# Patient Record
Sex: FEMALE | Race: WHITE | NOT HISPANIC OR LATINO | Employment: OTHER | URBAN - METROPOLITAN AREA
[De-identification: names, ages, dates, MRNs, and addresses within clinical notes are randomized per-mention and may not be internally consistent; named-entity substitution may affect disease eponyms.]

---

## 2017-01-09 ENCOUNTER — OFFICE VISIT (OUTPATIENT)
Dept: INTERNAL MEDICINE | Facility: CLINIC | Age: 37
End: 2017-01-09

## 2017-01-09 VITALS
SYSTOLIC BLOOD PRESSURE: 119 MMHG | HEART RATE: 73 BPM | WEIGHT: 208.1 LBS | BODY MASS INDEX: 33.6 KG/M2 | DIASTOLIC BLOOD PRESSURE: 83 MMHG

## 2017-01-09 DIAGNOSIS — F32.89 OTHER DEPRESSION: Primary | ICD-10-CM

## 2017-01-09 DIAGNOSIS — E78.00 HIGH BLOOD CHOLESTEROL: ICD-10-CM

## 2017-01-09 LAB
ALBUMIN SERPL-MCNC: 4.2 G/DL (ref 3.4–5)
ALP SERPL-CCNC: 64 U/L (ref 40–150)
ALT SERPL W P-5'-P-CCNC: 26 U/L (ref 0–50)
ANION GAP SERPL CALCULATED.3IONS-SCNC: 6 MMOL/L (ref 3–14)
AST SERPL W P-5'-P-CCNC: 13 U/L (ref 0–45)
BILIRUB SERPL-MCNC: 0.4 MG/DL (ref 0.2–1.3)
BUN SERPL-MCNC: 12 MG/DL (ref 7–30)
CALCIUM SERPL-MCNC: 8.7 MG/DL (ref 8.5–10.1)
CHLORIDE SERPL-SCNC: 105 MMOL/L (ref 94–109)
CHOLEST SERPL-MCNC: 146 MG/DL
CO2 SERPL-SCNC: 26 MMOL/L (ref 20–32)
CREAT SERPL-MCNC: 0.78 MG/DL (ref 0.52–1.04)
GFR SERPL CREATININE-BSD FRML MDRD: 84 ML/MIN/1.7M2
GLUCOSE SERPL-MCNC: 97 MG/DL (ref 70–99)
HDLC SERPL-MCNC: 56 MG/DL
LDLC SERPL CALC-MCNC: 80 MG/DL
NONHDLC SERPL-MCNC: 90 MG/DL
POTASSIUM SERPL-SCNC: 4.3 MMOL/L (ref 3.4–5.3)
PROT SERPL-MCNC: 7.2 G/DL (ref 6.8–8.8)
SODIUM SERPL-SCNC: 137 MMOL/L (ref 133–144)
TRIGL SERPL-MCNC: 51 MG/DL

## 2017-01-09 ASSESSMENT — PAIN SCALES - GENERAL: PAINLEVEL: NO PAIN (0)

## 2017-01-09 NOTE — PROGRESS NOTES
HPI:    Pt. Comes in with concerns regarding outside recent R breast imaging showing a cyst. She is present with her mother. On her mother's side there is a very strong h/o breast cancer at a young age. She o/w denies additional HEENT, cardiopulmonary, abdominal, , GYN, neurological, skin complaints. She has h/o migraines and has been on medication for several years. She was seen Neurology for this 7/27/16 and 9/16 and is doing better.     PMH:    1. Smoking  2. PCOS  3. Laparoscopic dermatoid tumor removal in 2005  4. Increased cholesterol on statin    Fam. Hx. As above for several with early breast cancer    PE:    Vitals noted gen nad cooperative alert, HEENT, ears normal oropharynx clear no exudate, neck supple nl rom no adenopathy, LCTA, B, normal resp. System exam, RRR, S1, S2, no MRG, abdomen, nt, nd, no masses, normal GI system exam. Grossly normal neurological exam. B breast exam w/o masses, no retractions, no discharge.    A/P:    1. She has follow up appointment with Dr. [facility] 12/19/16 and will get repeat breast imaging at age 40 (4 years)  2. She was seen in  GYN for management of PCOS 8/3/16 and has future pelvic U/S.  3. She is working on quitting smoking  4. Increased cholesterol; she saw  Dr. Solomon, Cardiology 10/16 and is on lipitor; labs today are stable  5. She was seen in  Neurology  for migraines 7/16 and 9/16 and is doing better on medication.  6. She remains on Wellbutrin and Zoloft. I recommended if she has concerns or changes we will have her see Psychiatry for a one time visit    Follow up with me in 6 months.             Total time spent 15  minutes.  More than 50% of the time spent with Ms. Oreilly on counseling / coordinating her care

## 2017-01-09 NOTE — MR AVS SNAPSHOT
After Visit Summary   1/9/2017    Majo Oreilly    MRN: [id number]           Patient Information     Date Of Birth          1980        Visit Information        Provider Department      1/9/2017 12:35 PM Cal Walker MD [facility]        Care Instructions    Primary Care Center Medication Refill Request Information:  * Please contact your pharmacy regarding ANY request for medication refills.  ** [facility] Prescription Fax = [phone]  * Please allow 3 business days for routine medication refills.  * Please allow 5 business days for controlled substance medication refills.     Primary Care Center Test Result notification information:  *You will be notified with in 7-10 days of your appointment day regarding the results of your test.  If you are on MyChart you will be notified as soon as the provider has reviewed the results and signed off on them.          Follow-ups after your visit        Your next 10 appointments already scheduled     Jul 10, 2017  1:35 PM   (Arrive by 1:20 PM)   Return Visit with Cal Walker MD   [facility] ([facility] [place])    [address] [place] [address] [phone]              Who to contact     Please call your clinic at [phone] to:    Ask questions about your health    Make or cancel appointments    Discuss your medicines    Learn about your test results    Speak to your doctor   If you have compliments or concerns about an experience at your clinic, or if you wish to file a complaint, please contact [facility] Patient Relations at [phone] or email us at [email or web address]         Additional Information About Your Visit        MyChart Information     Jawfish Gamest gives you secure access to your electronic health record. If you see a primary care provider, you can also send messages to your care team and make appointments. If you  have questions, please call your primary care clinic.  If you do not have a primary care provider, please call [phone] and they will assist you.      Landscape Mobile is an electronic gateway that provides easy, online access to your medical records. With Landscape Mobile, you can request a clinic appointment, read your test results, renew a prescription or communicate with your care team.     To access your existing account, please contact your [facility] or call [phone] for assistance.        Care EveryWhere ID     This is your Care EveryWhere ID. This could be used by other organizations to access your [place] medical records  [id number]        Your Vitals Were     Pulse Breastfeeding?                73 No           Blood Pressure from Last 3 Encounters:   01/09/17 119/83   12/21/16 121/78   10/12/16 118/77    Weight from Last 3 Encounters:   01/09/17 94.394 kg (208 lb 1.6 oz)   12/21/16 96.026 kg (211 lb 11.2 oz)   10/12/16 93.35 kg (205 lb 12.8 oz)              Today, you had the following     No orders found for display       Primary Care Provider Office Phone # Fax #    Cal Walker [phone]       [address] [address]        Thank you!     Thank you for choosing [facility]  for your care. Our goal is always to provide you with excellent care. Hearing back from our patients is one way we can continue to improve our services. Please take a few minutes to complete the written survey that you may receive in the mail after your visit with us. Thank you!             Your Updated Medication List - Protect others around you: Learn how to safely use, store and throw away your medicines at www.disposemymeds.org.          This list is accurate as of: 1/9/17 12:50 PM.  Always use your most recent med list.                   Brand Name Dispense Instructions for use    almotriptan 12.5 MG tablet    AXERT    36  tablet    Take 1 tablet (12.5 mg) by mouth daily as needed for migraine       atorvastatin 20 MG tablet    LIPITOR    30 tablet    Take 1 tablet (20 mg) by mouth daily       buPROPion 100 MG 12 hr tablet    WELLBUTRIN SR    60 tablet    Take 1 tablet (100 mg) by mouth 2 times daily Take 1 tablet daily for the first 3 to 5 days, depending on comfort.       FLUoxetine 10 MG capsule    PROZAC    90 capsule    Take 1 capsule (10 mg) by mouth daily       nicotine 21 MG/24HR 24 hr patch    NICODERM CQ    30 patch    Place 1 patch onto the skin every 24 hours       nicotine polacrilex 2 MG gum    NICORETTE    100 tablet    Chew 1 piece every 1-2 hours as directed.       norgestimate-ethinyl estradiol 0.25-35 MG-MCG per tablet    ORTHO-CYCLEN, SPRINTEC     Take 1 tablet by mouth daily       propranolol 80 MG 24 hr capsule    INDERAL LA    30 capsule    Take 1 capsule (80 mg) by mouth daily

## 2017-01-09 NOTE — PATIENT INSTRUCTIONS
Primary Care Center Medication Refill Request Information:  * Please contact your pharmacy regarding ANY request for medication refills.  ** [facility] Prescription Fax = [phone]  * Please allow 3 business days for routine medication refills.  * Please allow 5 business days for controlled substance medication refills.     Primary Care Center Test Result notification information:  *You will be notified with in 7-10 days of your appointment day regarding the results of your test.  If you are on MyChart you will be notified as soon as the provider has reviewed the results and signed off on them.

## 2017-01-09 NOTE — NURSING NOTE
Chief Complaint   Patient presents with     Results     Patient here for results from other appointments.     Mariam Flores LPN at 12:19 PM on 1/9/2017.

## 2017-07-01 DIAGNOSIS — F32.89 OTHER DEPRESSION: ICD-10-CM

## 2017-07-01 DIAGNOSIS — G43.009 NONINTRACTABLE MIGRAINE, UNSPECIFIED MIGRAINE TYPE: ICD-10-CM

## 2017-07-01 RX ORDER — FLUOXETINE 10 MG/1
10 CAPSULE ORAL DAILY
Qty: 90 CAPSULE | Refills: 1 | Status: SHIPPED | OUTPATIENT
Start: 2017-07-01 | End: 2017-12-28

## 2017-07-01 NOTE — TELEPHONE ENCOUNTER
FLUoxetine (PROZAC) 10 MG capsule  Last Written Prescription Date:  6/21/16  Last Fill Quantity: 90,   # refills: 3  Last Office Visit with [facility], [facility] or [facility] prescribing provider: 1/9/17  Future Office visit:   7/24/17

## 2017-07-14 DIAGNOSIS — E78.5 HYPERLIPIDEMIA LDL GOAL <100: Primary | ICD-10-CM

## 2017-07-14 RX ORDER — SIMVASTATIN 20 MG
20 TABLET ORAL AT BEDTIME
Qty: 90 TABLET | Refills: 3 | Status: SHIPPED | OUTPATIENT
Start: 2017-07-14 | End: 2018-06-25

## 2017-07-24 ENCOUNTER — OFFICE VISIT (OUTPATIENT)
Dept: INTERNAL MEDICINE | Facility: CLINIC | Age: 37
End: 2017-07-24

## 2017-07-24 VITALS
DIASTOLIC BLOOD PRESSURE: 85 MMHG | WEIGHT: 212.3 LBS | SYSTOLIC BLOOD PRESSURE: 124 MMHG | HEART RATE: 69 BPM | BODY MASS INDEX: 34.27 KG/M2 | RESPIRATION RATE: 16 BRPM

## 2017-07-24 DIAGNOSIS — E78.00 HIGH BLOOD CHOLESTEROL: Primary | ICD-10-CM

## 2017-07-24 ASSESSMENT — PAIN SCALES - GENERAL: PAINLEVEL: NO PAIN (0)

## 2017-07-24 NOTE — NURSING NOTE
Chief Complaint   Patient presents with     Recheck Medication     pt is here for a medication follow up        Monica Josue CMA at 2:54 PM on 7/24/2017

## 2017-07-24 NOTE — PATIENT INSTRUCTIONS
Primary Care Center Phone Number [phone]  Primary Care Center Medication Refill Request Information:  * Please contact your pharmacy regarding ANY request for medication refills.  ** [facility] Prescription Fax = [phone]  * Please allow 3 business days for routine medication refills.  * Please allow 5 business days for controlled substance medication refills.     Primary Care Center Test Result notification information:  *You will be notified with in 7-10 days of your appointment day regarding the results of your test.  If you are on MyChart you will be notified as soon as the provider has reviewed the results and signed off on them.

## 2017-07-24 NOTE — MR AVS SNAPSHOT
After Visit Summary   7/24/2017    Majo Oreilly    MRN: [id number]           Patient Information     Date Of Birth          1980        Visit Information        Provider Department      7/24/2017 3:05 PM Cal Walker MD [facility]        Today's Diagnoses     High blood cholesterol    -  1      Care Instructions    Primary Care Center Phone Number [phone]  Primary Care Center Medication Refill Request Information:  * Please contact your pharmacy regarding ANY request for medication refills.  ** [facility] Prescription Fax = [phone]  * Please allow 3 business days for routine medication refills.  * Please allow 5 business days for controlled substance medication refills.     Primary Care Center Test Result notification information:  *You will be notified with in 7-10 days of your appointment day regarding the results of your test.  If you are on MyChart you will be notified as soon as the provider has reviewed the results and signed off on them.                  Follow-ups after your visit        Your next 10 appointments already scheduled     Aug 24, 2017  9:30 AM CDT   LAB with [facility] ([facility])    [address] [place] [address] [phone]           Patient must bring picture ID.  Patient should be prepared to give a urine specimen  Please do not eat 10-12 hours before your appointment if you are coming in fasting for labs on lipids, cholesterol, or glucose (sugar).  Pregnant women should follow their Care Team instructions. Water with medications is okay. Do not drink coffee or other fluids.   If you have concerns about taking  your medications, please ask at office or if scheduling via Curio, send a message by clicking on Secure Messaging, Message Your Care Team.            Oct 24, 2017  3:35 PM CDT   (Arrive by 3:20 PM)   Return Visit with Cal Walker MD   [facility]  ([facility] [place])    [address]  4th Floor  [facility] [address] [phone]              Future tests that were ordered for you today     Open Future Orders        Priority Expected Expires Ordered    CBC with platelets differential Routine 7/24/2017 8/7/2017 7/24/2017    Lipid panel reflex to direct LDL Routine  7/24/2018 7/24/2017    Comprehensive metabolic panel Routine 7/24/2017 7/24/2018 7/24/2017            Who to contact     Please call your clinic at [phone] to:    Ask questions about your health    Make or cancel appointments    Discuss your medicines    Learn about your test results    Speak to your doctor   If you have compliments or concerns about an experience at your clinic, or if you wish to file a complaint, please contact [facility] Patient Relations at [phone] or email us at [email or web address]         Additional Information About Your Visit        KueskiharInvenQuery Information     Clean PETt gives you secure access to your electronic health record. If you see a primary care provider, you can also send messages to your care team and make appointments. If you have questions, please call your primary care clinic.  If you do not have a primary care provider, please call [phone] and they will assist you.      Xtellus is an electronic gateway that provides easy, online access to your medical records. With Xtellus, you can request a clinic appointment, read your test results, renew a prescription or communicate with your care team.     To access your existing account, please contact your [facility] or call [phone] for assistance.        Care EveryWhere ID     This is your Care EveryWhere ID. This could be used by other organizations to access your [place] medical records  [id number]        Your Vitals Were     Pulse Respirations Breastfeeding? BMI (Body Mass Index)          69 16 No  34.27 kg/m2         Blood Pressure from Last 3 Encounters:   07/24/17 124/85   01/09/17 119/83   12/21/16 121/78    Weight from Last 3 Encounters:   07/24/17 96.3 kg (212 lb 4.8 oz)   01/09/17 94.4 kg (208 lb 1.6 oz)   12/21/16 96 kg (211 lb 11.2 oz)               Primary Care Provider Office Phone # Fax #    Cal ANAYA MD Denise [phone]       [address] [address]        Equal Access to Services     [facility]: Hadii aad ku hadasho Soomaali, waaxda luqadaha, qaybta kaalmada adeegyada, jimmy montes . So [facility] [phone].    ATENCIÓN: Si habla español, tiene a whitmore disposición servicios gratuitos de asistencia lingüística. Llame al [phone].    We comply with applicable federal civil rights laws and [place] laws. We do not discriminate on the basis of race, color, national origin, age, disability sex, sexual orientation or gender identity.            Thank you!     Thank you for choosing [facility]  for your care. Our goal is always to provide you with excellent care. Hearing back from our patients is one way we can continue to improve our services. Please take a few minutes to complete the written survey that you may receive in the mail after your visit with us. Thank you!             Your Updated Medication List - Protect others around you: Learn how to safely use, store and throw away your medicines at www.disposemymeds.org.          This list is accurate as of: 7/24/17  3:25 PM.  Always use your most recent med list.                   Brand Name Dispense Instructions for use Diagnosis    almotriptan 12.5 MG tablet    AXERT    36 tablet    Take 1 tablet (12.5 mg) by mouth daily as needed for migraine    Migraine without aura and without status migrainosus, not intractable       FLUoxetine 10 MG capsule    PROZAC    90 capsule    Take 1 capsule (10 mg) by mouth daily    Other depression, Nonintractable  migraine, unspecified migraine type       norgestimate-ethinyl estradiol 0.25-35 MG-MCG per tablet    ORTHO-CYCLEN, SPRINTEC     Take 1 tablet by mouth daily    Nonintractable migraine, unspecified migraine type, Other depression       propranolol 80 MG 24 hr capsule    INDERAL LA    30 capsule    Take 1 capsule (80 mg) by mouth daily    Chronic migraine without aura without status migrainosus, not intractable       simvastatin 20 MG tablet    ZOCOR    90 tablet    Take 1 tablet (20 mg) by mouth At Bedtime    Hyperlipidemia LDL goal <100

## 2017-07-24 NOTE — PROGRESS NOTES
HPI:    Pt. Comes in for follow up She had issues  with   outside  R breast imaging showing a cyst.  On her mother's side there is a very strong h/o breast cancer at a young age. She o/w denies additional HEENT, cardiopulmonary, abdominal, , GYN, neurological, skin complaints. She has h/o migraines and has been on medication for several years. She was seen Neurology for this 7/27/16 and 9/16 and is doing better.     PMH:    1. Smoking  2. PCOS  3. Laparoscopic dermatoid tumor removal in 2005  4. Increased cholesterol on statin    Fam. Hx. As above for several with early breast cancer    PE:    Vitals noted gen nad cooperative alert, HEENT, ears normal oropharynx clear no exudate, neck supple nl rom no adenopathy, LCTA, B, normal resp. System exam, RRR, S1, S2, no MRG, abdomen, nt, nd, no masses, normal GI system exam. Grossly normal neurological exam.     A/P:    1. She has follow up appointment with Dr. [facility] 12/19/16 and will get repeat breast imaging at age 40 (4 years)  2. She was seen in  GYN for management of PCOS 8/3/16 and has future pelvic U/S.  3. She has stopped  Smoking for about 3 weeks today  4. Increased cholesterol; she saw  Dr. Solomon, Cardiology 10/16. She had to stop Lipitor because of side effects and now is on Simvastatin (for about 3 weeks). No side effects and placed future fasting lipid order labs  5. She was seen in  Neurology  for migraines 7/16 and 9/16 and is doing better on medication.  6. She remains on Wellbutrin and Zoloft. I recommended if she has concerns or changes we will have her see Psychiatry for a one time visit    Follow up with me in 3 months.             Total time spent 15  minutes.  More than 50% of the time spent with Ms. Oreilly on counseling / coordinating her care

## 2017-08-24 DIAGNOSIS — E78.00 HIGH BLOOD CHOLESTEROL: ICD-10-CM

## 2017-08-24 LAB
ALBUMIN SERPL-MCNC: 3.7 G/DL (ref 3.4–5)
ALP SERPL-CCNC: 64 U/L (ref 40–150)
ALT SERPL W P-5'-P-CCNC: 18 U/L (ref 0–50)
ANION GAP SERPL CALCULATED.3IONS-SCNC: 7 MMOL/L (ref 3–14)
AST SERPL W P-5'-P-CCNC: 10 U/L (ref 0–45)
BASOPHILS # BLD AUTO: 0 10E9/L (ref 0–0.2)
BASOPHILS NFR BLD AUTO: 0.4 %
BILIRUB SERPL-MCNC: 0.5 MG/DL (ref 0.2–1.3)
BUN SERPL-MCNC: 7 MG/DL (ref 7–30)
CALCIUM SERPL-MCNC: 8.2 MG/DL (ref 8.5–10.1)
CHLORIDE SERPL-SCNC: 106 MMOL/L (ref 94–109)
CHOLEST SERPL-MCNC: 136 MG/DL
CO2 SERPL-SCNC: 24 MMOL/L (ref 20–32)
CREAT SERPL-MCNC: 0.77 MG/DL (ref 0.52–1.04)
DIFFERENTIAL METHOD BLD: ABNORMAL
EOSINOPHIL # BLD AUTO: 0.3 10E9/L (ref 0–0.7)
EOSINOPHIL NFR BLD AUTO: 2.9 %
ERYTHROCYTE [DISTWIDTH] IN BLOOD BY AUTOMATED COUNT: 13.3 % (ref 10–15)
GFR SERPL CREATININE-BSD FRML MDRD: 84 ML/MIN/1.7M2
GLUCOSE SERPL-MCNC: 90 MG/DL (ref 70–99)
HCT VFR BLD AUTO: 42.1 % (ref 35–47)
HDLC SERPL-MCNC: 44 MG/DL
HGB BLD-MCNC: 14.3 G/DL (ref 11.7–15.7)
IMM GRANULOCYTES # BLD: 0 10E9/L (ref 0–0.4)
IMM GRANULOCYTES NFR BLD: 0.4 %
LDLC SERPL CALC-MCNC: 76 MG/DL
LYMPHOCYTES # BLD AUTO: 3.3 10E9/L (ref 0.8–5.3)
LYMPHOCYTES NFR BLD AUTO: 29.7 %
MCH RBC QN AUTO: 29.5 PG (ref 26.5–33)
MCHC RBC AUTO-ENTMCNC: 34 G/DL (ref 31.5–36.5)
MCV RBC AUTO: 87 FL (ref 78–100)
MONOCYTES # BLD AUTO: 0.5 10E9/L (ref 0–1.3)
MONOCYTES NFR BLD AUTO: 4.3 %
NEUTROPHILS # BLD AUTO: 7 10E9/L (ref 1.6–8.3)
NEUTROPHILS NFR BLD AUTO: 62.3 %
NONHDLC SERPL-MCNC: 92 MG/DL
NRBC # BLD AUTO: 0 10*3/UL
NRBC BLD AUTO-RTO: 0 /100
PLATELET # BLD AUTO: 343 10E9/L (ref 150–450)
POTASSIUM SERPL-SCNC: 4 MMOL/L (ref 3.4–5.3)
PROT SERPL-MCNC: 7 G/DL (ref 6.8–8.8)
RBC # BLD AUTO: 4.85 10E12/L (ref 3.8–5.2)
SODIUM SERPL-SCNC: 137 MMOL/L (ref 133–144)
TRIGL SERPL-MCNC: 84 MG/DL
WBC # BLD AUTO: 11.1 10E9/L (ref 4–11)

## 2017-10-22 ENCOUNTER — HEALTH MAINTENANCE LETTER (OUTPATIENT)
Age: 37
End: 2017-10-22

## 2017-10-24 ENCOUNTER — OFFICE VISIT (OUTPATIENT)
Dept: INTERNAL MEDICINE | Facility: CLINIC | Age: 37
End: 2017-10-24

## 2017-10-24 VITALS
WEIGHT: 217.3 LBS | DIASTOLIC BLOOD PRESSURE: 84 MMHG | HEART RATE: 64 BPM | BODY MASS INDEX: 35.07 KG/M2 | SYSTOLIC BLOOD PRESSURE: 119 MMHG

## 2017-10-24 DIAGNOSIS — G43.709 CHRONIC MIGRAINE WITHOUT AURA WITHOUT STATUS MIGRAINOSUS, NOT INTRACTABLE: ICD-10-CM

## 2017-10-24 DIAGNOSIS — Z23 NEED FOR PROPHYLACTIC VACCINATION AND INOCULATION AGAINST INFLUENZA: Primary | ICD-10-CM

## 2017-10-24 RX ORDER — PROPRANOLOL HYDROCHLORIDE 80 MG/1
80 CAPSULE, EXTENDED RELEASE ORAL DAILY
Qty: 30 CAPSULE | Refills: 11 | Status: SHIPPED | OUTPATIENT
Start: 2017-10-24 | End: 2018-10-29

## 2017-10-24 ASSESSMENT — PAIN SCALES - GENERAL: PAINLEVEL: NO PAIN (0)

## 2017-10-24 NOTE — NURSING NOTE
Chief Complaint   Patient presents with     Results     Patient here to go over results.     Mariam Flores LPN at 9:36 AM on 10/24/2017.

## 2017-10-24 NOTE — PROGRESS NOTES
HPI:    Pt. Comes in for follow up She had issues  with   outside  R breast imaging showing a cyst.  On her mother's side there is a very strong h/o breast cancer at a young age. She o/w denies additional HEENT, cardiopulmonary, abdominal, , GYN, neurological, skin complaints. She has h/o migraines and has been on medication for several years. She was seen Neurology for this 7/27/16 and 9/16 and is doing better.     PMH:    1. Smoking  2. PCOS  3. Laparoscopic dermatoid tumor removal in 2005  4. Increased cholesterol on statin    Fam. Hx. As above for several with early breast cancer    PE:    Vitals noted gen nad cooperative alert, HEENT, ears normal oropharynx clear no exudate, neck supple nl rom no adenopathy, LCTA, B, normal resp. System exam, RRR, S1, S2, no MRG, abdomen, nt, nd, no masses, normal GI system exam. Grossly normal neurological exam.     A/P:    1. She has follow up appointment with Dr. [facility] 12/19/16 and will get repeat breast imaging at age 40 (4 years)  2. She was seen in  GYN for management of PCOS 8/3/16 and has future pelvic U/S.  3. She is down to 2 cigarettes a day now  4. Increased cholesterol; she saw  Dr. Solomon, Cardiology 10/16. She had to stop Lipitor because of side effects and now is on Simvastatin (for about 3 weeks). No side effects. Labs checked 8/17  5. She was seen in  Neurology  for migraines 7/16 and 9/16 and is doing better on medication. She states rare migraines now. Usually once a month (when before about 5 times a month)  6. She remains on Fluoxetine low dose w/o problems. She does not want to change or increase      Follow up with me 8/2018            Total time spent 15  minutes.  More than 50% of the time spent with Ms. Oreilly on counseling / coordinating her care

## 2017-10-24 NOTE — MR AVS SNAPSHOT
After Visit Summary   10/24/2017    Majo Oreilly    MRN: [id number]           Patient Information     Date Of Birth          1980        Visit Information        Provider Department      10/24/2017 9:35 AM Cal Walker MD [facility]        Today's Diagnoses     Need for prophylactic vaccination and inoculation against influenza    -  1    Chronic migraine without aura without status migrainosus, not intractable           Follow-ups after your visit        Your next 10 appointments already scheduled     Jul 23, 2018 10:05 AM CDT   (Arrive by 9:50 AM)   Return Visit with Cal Walker MD   [facility] ([facility])    [address]  4th Floor  [facility] [address] [phone]              Who to contact     Please call your clinic at [phone] to:    Ask questions about your health    Make or cancel appointments    Discuss your medicines    Learn about your test results    Speak to your doctor   If you have compliments or concerns about an experience at your clinic, or if you wish to file a complaint, please contact [facility] Patient Relations at [phone] or email us at [email or web address]         Additional Information About Your Visit        MyChart Information     [employer] gives you secure access to your electronic health record. If you see a primary care provider, you can also send messages to your care team and make appointments. If you have questions, please call your primary care clinic.  If you do not have a primary care provider, please call [phone] and they will assist you.      [employer] is an electronic gateway that provides easy, online access to your medical records. With [employer], you can request a clinic appointment, read your test results, renew a prescription or communicate with your care team.     To access your existing account, please contact your  [facility] or call [phone] for assistance.        Care EveryWhere ID     This is your Care EveryWhere ID. This could be used by other organizations to access your [place] medical records  [id number]        Your Vitals Were     Pulse Breastfeeding? BMI (Body Mass Index)             64 No 35.07 kg/m2          Blood Pressure from Last 3 Encounters:   10/24/17 119/84   07/24/17 124/85   01/09/17 119/83    Weight from Last 3 Encounters:   10/24/17 98.6 kg (217 lb 4.8 oz)   07/24/17 96.3 kg (212 lb 4.8 oz)   01/09/17 94.4 kg (208 lb 1.6 oz)              We Performed the Following     FLU VACCINE, 3 YRS +, IM  [[id number]]          Where to get your medicines      These medications were sent to [facility] [address] [place] [address] [address] AT [address] [place] [address] & [address] [address]    Hours:  24-hours Phone:  [phone]     propranolol 80 MG 24 hr capsule          Primary Care Provider Office Phone # Fax #    Cal Walker [phone]       [address] [address]        Equal Access to Services     MOOKIE WOOD : Hadii jennifer caro hadasho Soomaali, waaxda luqadaha, qaybta kaalmada adeegyada, waxmerly moncada. So [facility] [phone].    ATENCIÓN: Si habla español, tiene a whitmore disposición servicios gratuitos de asistencia lingüística. Llame al [phone].    We comply with applicable federal civil rights laws and [place] laws. We do not discriminate on the basis of race, color, national origin, age, disability, sex, sexual orientation, or gender identity.            Thank you!     Thank you for choosing [facility]  for your care. Our goal is always to provide you with excellent care. Hearing back from our patients is one way we can continue to improve our services. Please take a few minutes to complete the written survey that you may receive in the mail after your visit  with us. Thank you!             Your Updated Medication List - Protect others around you: Learn how to safely use, store and throw away your medicines at www.disposemymeds.org.          This list is accurate as of: 10/24/17  9:55 AM.  Always use your most recent med list.                   Brand Name Dispense Instructions for use Diagnosis    almotriptan 12.5 MG tablet    AXERT    36 tablet    Take 1 tablet (12.5 mg) by mouth daily as needed for migraine    Migraine without aura and without status migrainosus, not intractable       FLUoxetine 10 MG capsule    PROZAC    90 capsule    Take 1 capsule (10 mg) by mouth daily    Other depression, Nonintractable migraine, unspecified migraine type       norgestimate-ethinyl estradiol 0.25-35 MG-MCG per tablet    ORTHO-CYCLEN, SPRINTEC     Take 1 tablet by mouth daily    Nonintractable migraine, unspecified migraine type, Other depression       propranolol 80 MG 24 hr capsule    INDERAL LA    30 capsule    Take 1 capsule (80 mg) by mouth daily    Chronic migraine without aura without status migrainosus, not intractable       simvastatin 20 MG tablet    ZOCOR    90 tablet    Take 1 tablet (20 mg) by mouth At Bedtime    Hyperlipidemia LDL goal <100

## 2017-10-24 NOTE — NURSING NOTE
"Injectable Influenza Immunization Documentation    1.  Has the patient received the information for the injectable influenza vaccine? YES     2. Is the patient 6 months of age or older? YES     3. Does the patient have any of the following contraindications?         Severe allergy to eggs? No     Severe allergic reaction to previous influenza vaccines? No   Severe allergy to latex? No       History of Guillain-[place] syndrome? No     Currently have a temperature greater than 100.4F? No        4.  Severely egg allergic patients should have flu vaccine eligibility assessed by an MD, RN, or pharmacist, and those who received flu vaccine should be observed for 15 min by an MD, RN, Pharmacist, Medical Technician, or member of clinic staff.\": YES    5. Latex-allergic patients should be given latex-free influenza vaccine N/A. Please reference the Vaccine latex table to determine if your clinic s product is latex-containing.       Vaccination given by Mariam Flores LPN at 10:10 AM on 10/24/2017.          "

## 2017-12-28 DIAGNOSIS — G43.009 NONINTRACTABLE MIGRAINE, UNSPECIFIED MIGRAINE TYPE: ICD-10-CM

## 2017-12-28 DIAGNOSIS — F32.89 OTHER DEPRESSION: ICD-10-CM

## 2017-12-29 NOTE — TELEPHONE ENCOUNTER
FLUoxetine (PROZAC) 10 MG capsule      Last Written Prescription Date:  7-1-17  Last Fill Quantity: 90,   # refills: 1  Last Office Visit : 10-24-17  Future Office visit:  7-23-18    Routing refill request to provider for review/approval because:  No PHQ-9 on record      Kathleen M Doege RN

## 2018-01-02 RX ORDER — FLUOXETINE 10 MG/1
10 CAPSULE ORAL DAILY
Qty: 90 CAPSULE | Refills: 0 | Status: SHIPPED | OUTPATIENT
Start: 2018-01-02 | End: 2018-03-22

## 2018-03-22 DIAGNOSIS — F32.89 OTHER DEPRESSION: ICD-10-CM

## 2018-03-22 DIAGNOSIS — G43.009 NONINTRACTABLE MIGRAINE, UNSPECIFIED MIGRAINE TYPE: ICD-10-CM

## 2018-03-22 RX ORDER — FLUOXETINE 10 MG/1
CAPSULE ORAL
Qty: 90 CAPSULE | Refills: 1 | Status: SHIPPED | OUTPATIENT
Start: 2018-03-22 | End: 2018-08-01

## 2018-03-22 NOTE — TELEPHONE ENCOUNTER
prozac  Last Written Prescription Date:  1/2/18  Last Fill Quantity: 90  # refills: 0  Last Office Visit : 10/24/17  Future Office visit:  7/23/18    Routing refill request to clinic nurse for review/approval because:  Failed protocol

## 2018-06-25 DIAGNOSIS — E78.5 HYPERLIPIDEMIA LDL GOAL <100: ICD-10-CM

## 2018-06-28 RX ORDER — SIMVASTATIN 20 MG
20 TABLET ORAL AT BEDTIME
Qty: 90 TABLET | Refills: 0 | Status: SHIPPED | OUTPATIENT
Start: 2018-06-28 | End: 2018-07-23

## 2018-07-19 ENCOUNTER — MEDICAL CORRESPONDENCE (OUTPATIENT)
Dept: HEALTH INFORMATION MANAGEMENT | Facility: CLINIC | Age: 38
End: 2018-07-19

## 2018-07-19 DIAGNOSIS — D27.0 TERATOMA OF OVARY, RIGHT: Primary | ICD-10-CM

## 2018-07-23 ENCOUNTER — OFFICE VISIT (OUTPATIENT)
Dept: INTERNAL MEDICINE | Facility: CLINIC | Age: 38
End: 2018-07-23
Payer: COMMERCIAL

## 2018-07-23 VITALS
DIASTOLIC BLOOD PRESSURE: 82 MMHG | SYSTOLIC BLOOD PRESSURE: 115 MMHG | RESPIRATION RATE: 16 BRPM | WEIGHT: 211 LBS | BODY MASS INDEX: 34.06 KG/M2 | HEART RATE: 74 BPM

## 2018-07-23 DIAGNOSIS — D27.0 TERATOMA OF OVARY, RIGHT: ICD-10-CM

## 2018-07-23 DIAGNOSIS — E78.00 HIGH BLOOD CHOLESTEROL: Primary | ICD-10-CM

## 2018-07-23 DIAGNOSIS — E78.5 HYPERLIPIDEMIA LDL GOAL <100: ICD-10-CM

## 2018-07-23 LAB
ALBUMIN SERPL-MCNC: 3.6 G/DL (ref 3.4–5)
ALP SERPL-CCNC: 78 U/L (ref 40–150)
ALT SERPL W P-5'-P-CCNC: 21 U/L (ref 0–50)
ANION GAP SERPL CALCULATED.3IONS-SCNC: 8 MMOL/L (ref 3–14)
AST SERPL W P-5'-P-CCNC: 14 U/L (ref 0–45)
BASOPHILS # BLD AUTO: 0 10E9/L (ref 0–0.2)
BASOPHILS NFR BLD AUTO: 0.2 %
BILIRUB SERPL-MCNC: 0.4 MG/DL (ref 0.2–1.3)
BUN SERPL-MCNC: 6 MG/DL (ref 7–30)
CALCIUM SERPL-MCNC: 8.3 MG/DL (ref 8.5–10.1)
CHLORIDE SERPL-SCNC: 103 MMOL/L (ref 94–109)
CHOLEST SERPL-MCNC: 132 MG/DL
CO2 SERPL-SCNC: 22 MMOL/L (ref 20–32)
CREAT SERPL-MCNC: 0.76 MG/DL (ref 0.52–1.04)
DIFFERENTIAL METHOD BLD: ABNORMAL
EOSINOPHIL # BLD AUTO: 0.3 10E9/L (ref 0–0.7)
EOSINOPHIL NFR BLD AUTO: 1.8 %
ERYTHROCYTE [DISTWIDTH] IN BLOOD BY AUTOMATED COUNT: 12.9 % (ref 10–15)
GFR SERPL CREATININE-BSD FRML MDRD: 86 ML/MIN/1.7M2
GLUCOSE SERPL-MCNC: 98 MG/DL (ref 70–99)
HCT VFR BLD AUTO: 42.6 % (ref 35–47)
HDLC SERPL-MCNC: 39 MG/DL
HGB BLD-MCNC: 14.7 G/DL (ref 11.7–15.7)
IMM GRANULOCYTES # BLD: 0.1 10E9/L (ref 0–0.4)
IMM GRANULOCYTES NFR BLD: 0.7 %
LDLC SERPL CALC-MCNC: 70 MG/DL
LYMPHOCYTES # BLD AUTO: 3.3 10E9/L (ref 0.8–5.3)
LYMPHOCYTES NFR BLD AUTO: 23.2 %
MCH RBC QN AUTO: 29.8 PG (ref 26.5–33)
MCHC RBC AUTO-ENTMCNC: 34.5 G/DL (ref 31.5–36.5)
MCV RBC AUTO: 86 FL (ref 78–100)
MONOCYTES # BLD AUTO: 0.7 10E9/L (ref 0–1.3)
MONOCYTES NFR BLD AUTO: 4.8 %
NEUTROPHILS # BLD AUTO: 9.8 10E9/L (ref 1.6–8.3)
NEUTROPHILS NFR BLD AUTO: 69.3 %
NONHDLC SERPL-MCNC: 93 MG/DL
NRBC # BLD AUTO: 0 10*3/UL
NRBC BLD AUTO-RTO: 0 /100
PLATELET # BLD AUTO: 330 10E9/L (ref 150–450)
POTASSIUM SERPL-SCNC: 3.9 MMOL/L (ref 3.4–5.3)
PROT SERPL-MCNC: 7.2 G/DL (ref 6.8–8.8)
RBC # BLD AUTO: 4.94 10E12/L (ref 3.8–5.2)
SODIUM SERPL-SCNC: 134 MMOL/L (ref 133–144)
TRIGL SERPL-MCNC: 112 MG/DL
WBC # BLD AUTO: 14.2 10E9/L (ref 4–11)

## 2018-07-23 RX ORDER — SIMVASTATIN 20 MG
20 TABLET ORAL AT BEDTIME
Qty: 90 TABLET | Refills: 3 | Status: SHIPPED | OUTPATIENT
Start: 2018-07-23 | End: 2019-09-13

## 2018-07-23 ASSESSMENT — PAIN SCALES - GENERAL: PAINLEVEL: MODERATE PAIN (5)

## 2018-07-23 NOTE — PROGRESS NOTES
HPI:    Pt. Comes in for follow up She had issues  with   outside  R breast imaging showing a cyst.  On her mother's side there is a very strong h/o breast cancer at a young age. She o/w denies additional HEENT, cardiopulmonary, abdominal, , GYN, neurological, skin complaints. She has h/o migraines and has been on medication for several years. She was seen Neurology for this 7/27/16 and 9/16 and is doing better.     PMH:    1. Smoking  2. PCOS  3. Laparoscopic dermatoid tumor removal in 2005  4. Increased cholesterol on statin    Fam. Hx. As above for several with early breast cancer    PE:    Vitals noted gen nad cooperative alert, HEENT, ears normal oropharynx clear no exudate, neck supple nl rom no adenopathy, LCTA, B, normal resp. System exam, RRR, S1, S2, no MRG, abdomen, nt, nd, no masses, normal GI system exam. Grossly normal neurological exam.     A/P:    1. She has follow up appointment with Dr. [facility] 12/19/16 and will get repeat breast imaging at age 40 (4 years)  2. She was seen in  GYN for management of PCOS 8/3/16 and has future pelvic U/S.  3. She is down to 2 cigarettes a day now  4. Increased cholesterol; she saw  Dr. Solomon, Cardiology 10/16. She had to stop Lipitor because of side effects and now is on Simvastatin (for about 3 weeks). No side effects. Labs checked 8/17. Ordered labs today and refilled Simvastatin. She is aware she can't be pregnant on this medication.   5. She was seen in  Neurology  for migraines 7/16 and 9/16 and is doing better on medication. She states rare migraines now. Usually once a month (when before about 5 times a month)  6. She remains on Fluoxetine low dose w/o problems. She does not want to change or increase  7. Seen at [facility] (see note in Care Everywhere 7/11/2018; she had imaging, MRI/pelvic U/S and lab tests). Surgery on 8/6/2018 is planned at [facility] for ovarian cyst and possible partial hysterectomy.                   Total time spent 15   minutes.  More than 50% of the time spent with Ms. Oreilly on counseling / coordinating her care

## 2018-07-23 NOTE — MR AVS SNAPSHOT
After Visit Summary   7/23/2018    Majo Oreilly    MRN: [id number]           Patient Information     Date Of Birth          1980        Visit Information        Provider Department      7/23/2018 10:05 AM Cal Walker MD [facility]        Today's Diagnoses     High blood cholesterol    -  1    Hyperlipidemia LDL goal <100        Teratoma of ovary, right           Follow-ups after your visit        Your next 10 appointments already scheduled     Jul 23, 2018 10:45 AM CDT   LAB with  LAB   [facility] ([facility])    [address] [place] [address] [phone]           Please do not eat 10-12 hours before your appointment if you are coming in fasting for labs on lipids, cholesterol, or glucose (sugar). This does not apply to pregnant women. Water, hot tea and black coffee (with nothing added) are okay. Do not drink other fluids, diet soda or chew gum.              Who to contact     Please call your clinic at [phone] to:    Ask questions about your health    Make or cancel appointments    Discuss your medicines    Learn about your test results    Speak to your doctor            Additional Information About Your Visit        [employer] Information     [employer] gives you secure access to your electronic health record. If you see a primary care provider, you can also send messages to your care team and make appointments. If you have questions, please call your primary care clinic.  If you do not have a primary care provider, please call [phone] and they will assist you.      [employer] is an electronic gateway that provides easy, online access to your medical records. With [employer], you can request a clinic appointment, read your test results, renew a prescription or communicate with your care team.     To access your existing account, please contact your [facility] or call [phone]  for assistance.        Care EveryWhere ID     This is your Care EveryWhere ID. This could be used by other organizations to access your [place] medical records  [id number]        Your Vitals Were     Pulse Respirations Last Period Breastfeeding? BMI (Body Mass Index)       74 16 07/01/2018 No 34.06 kg/m2        Blood Pressure from Last 3 Encounters:   07/23/18 115/82   10/24/17 119/84   07/24/17 124/85    Weight from Last 3 Encounters:   07/23/18 95.7 kg (211 lb)   10/24/17 98.6 kg (217 lb 4.8 oz)   07/24/17 96.3 kg (212 lb 4.8 oz)                 Where to get your medicines      These medications were sent to [email or web address] [address] [place] [address] [address] AT [address] [place] [address] & [facility]  [address] [place] [address] AVE [facility] [address]    Hours:  24-hours Phone:  [phone]     simvastatin 20 MG tablet          Primary Care Provider Office Phone # Fax #    Cal Walker [phone]       [address] [address]        Equal Access to Services     [facility] HospitalHARDEEP AH: Hadii aad ku hadasho Soomaali, waaxda luqadaha, qaybta kaalmada adeegyada, waxay idiin hayaan adejazmyn thomas lajohn ah. So [facility] [phone].    ATENCIÓN: Si habla español, tiene a whitmore disposición servicios gratuitos de asistencia lingüística. Yumiko al [phone].    We comply with applicable federal civil rights laws and [place] laws. We do not discriminate on the basis of race, color, national origin, age, disability, sex, sexual orientation, or gender identity.            Thank you!     Thank you for choosing [facility]  for your care. Our goal is always to provide you with excellent care. Hearing back from our patients is one way we can continue to improve our services. Please take a few minutes to complete the written survey that you may receive in the mail after your visit with us. Thank you!             Your Updated Medication List - Protect others around you: Learn how to safely use, store  and throw away your medicines at www.disposemymeds.org.          This list is accurate as of 7/23/18 10:07 AM.  Always use your most recent med list.                   Brand Name Dispense Instructions for use Diagnosis    almotriptan 12.5 MG tablet    AXERT    36 tablet    Take 1 tablet (12.5 mg) by mouth daily as needed for migraine    Migraine without aura and without status migrainosus, not intractable       FLUoxetine 10 MG capsule    PROzac    90 capsule    TAKE 1 CAPSULE BY MOUTH EVERY DAY    Other depression, Nonintractable migraine, unspecified migraine type       IBUPROFEN PO      Take by mouth as needed for moderate pain    High blood cholesterol       norgestimate-ethinyl estradiol 0.25-35 MG-MCG per tablet    ORTHO-CYCLEN, SPRINTEC     Take 1 tablet by mouth daily    Nonintractable migraine, unspecified migraine type, Other depression       propranolol 80 MG 24 hr capsule    INDERAL LA    30 capsule    Take 1 capsule (80 mg) by mouth daily    Chronic migraine without aura without status migrainosus, not intractable       simvastatin 20 MG tablet    ZOCOR    90 tablet    Take 1 tablet (20 mg) by mouth At Bedtime Please contact PCP or be seen in clinic by PA/NP for further refills.    Hyperlipidemia LDL goal <100

## 2018-07-23 NOTE — NURSING NOTE
Chief Complaint   Patient presents with     Recheck Medication     Patient is here to follow up with medication     Sabrina Mosley CMA 9:45 AM on 7/23/2018.

## 2018-07-25 ENCOUNTER — TELEPHONE (OUTPATIENT)
Dept: INTERNAL MEDICINE | Facility: CLINIC | Age: 38
End: 2018-07-25

## 2018-07-25 DIAGNOSIS — R03.0 ELEVATED BLOOD PRESSURE READING WITHOUT DIAGNOSIS OF HYPERTENSION: Primary | ICD-10-CM

## 2018-07-25 NOTE — TELEPHONE ENCOUNTER
Placed future cbc order this encounter    Dear Collette;    Your labs are attached. Your white blood cell count is somewhat elevated and I recommend just rechecking in a month or so. I placed a future lab order for this and you can call [phone] to schedule this appointment    MD Jian

## 2018-08-01 DIAGNOSIS — G43.009 MIGRAINE WITHOUT AURA AND WITHOUT STATUS MIGRAINOSUS, NOT INTRACTABLE: ICD-10-CM

## 2018-08-01 DIAGNOSIS — G43.009 NONINTRACTABLE MIGRAINE, UNSPECIFIED MIGRAINE TYPE: ICD-10-CM

## 2018-08-01 DIAGNOSIS — F32.89 OTHER DEPRESSION: ICD-10-CM

## 2018-08-01 NOTE — TELEPHONE ENCOUNTER
prozac    Last Written Prescription Date:  3/22/18  Last Fill Quantity:90   # refills: 1  axert    Last Written Prescription Date:  7/27/16  Last Fill Quantity: 36   # refills: 3  Last Office Visit : 7/23/18  Future Office visit:  No future appt    Routing refill request to nurse for review/approval because:  Failed protocol criteria  Axert last ordered by neurology

## 2018-08-02 RX ORDER — ALMOTRIPTAN 12.5 MG/1
12.5 TABLET, FILM COATED ORAL DAILY PRN
Qty: 36 TABLET | Refills: 3 | Status: SHIPPED | OUTPATIENT
Start: 2018-08-02 | End: 2018-10-08

## 2018-08-02 RX ORDER — FLUOXETINE 10 MG/1
CAPSULE ORAL
Qty: 90 CAPSULE | Refills: 3 | Status: SHIPPED | OUTPATIENT
Start: 2018-08-02 | End: 2019-09-13

## 2018-09-18 DIAGNOSIS — F32.89 OTHER DEPRESSION: ICD-10-CM

## 2018-09-18 DIAGNOSIS — G43.009 NONINTRACTABLE MIGRAINE, UNSPECIFIED MIGRAINE TYPE: ICD-10-CM

## 2018-09-19 RX ORDER — FLUOXETINE 10 MG/1
CAPSULE ORAL
Qty: 90 CAPSULE | Refills: 0 | OUTPATIENT
Start: 2018-09-19

## 2018-10-08 ENCOUNTER — TELEPHONE (OUTPATIENT)
Dept: INTERNAL MEDICINE | Facility: CLINIC | Age: 38
End: 2018-10-08

## 2018-10-08 DIAGNOSIS — G43.009 MIGRAINE WITHOUT AURA AND WITHOUT STATUS MIGRAINOSUS, NOT INTRACTABLE: ICD-10-CM

## 2018-10-08 RX ORDER — ALMOTRIPTAN 12.5 MG/1
12.5 TABLET, FILM COATED ORAL DAILY PRN
Qty: 36 TABLET | Refills: 3 | Status: SHIPPED | OUTPATIENT
Start: 2018-10-08 | End: 2019-11-08

## 2018-10-08 NOTE — TELEPHONE ENCOUNTER
Prior Authorization Approval    Authorization Effective Date: 10/8/2018  Authorization Expiration Date: 10/8/2021  Medication: almotriptan (AXERT) 12.5 MG tablet-APPROVED  Approved Dose/Quantity:   Reference #: [id number]   Insurance Company: [employer] - Phone [phone] Fax [phone]  Expected CoPay:       CoPay Card Available:      Foundation Assistance Needed:    Which Pharmacy is filling the prescription (Not needed for infusion/clinic administered[facility] [address] [place], [place] - [address]  Pharmacy Notified: Yes  Patient Notified: No

## 2018-10-08 NOTE — TELEPHONE ENCOUNTER
Central Prior Authorization Team   Phone: [phone]      PA Initiation    Medication: almotriptan (AXERT) 12.5 MG tablet-PA Initaited  Insurance Company: [employer] - Phone [phone] Fax [phone]  Pharmacy Filling the Rx: [facility] [address] [place], [place] - [address] AT [address] [place] [address] & [facility]  Filling Pharmacy Phone: [phone]  Filling Pharmacy Fax: [phone]  Start Date: 10/8/2018

## 2018-10-08 NOTE — TELEPHONE ENCOUNTER
Prior Authorization Retail Medication Request    Medication/Dose: Axert  ICD code (if different than what is on RX):  G43.009  Previously Tried and Failed:  Unknown  Rationale:  None    Insurance Name:  [facility] [employer]  Insurance ID:  [id number]      Pharmacy Information (if different than what is on RX)  Name:  [facility]  Phone:  [phone]      Prior auth.  Sudha Boykin RN 8:59 AM on 10/8/2018.

## 2018-10-29 DIAGNOSIS — G43.709 CHRONIC MIGRAINE WITHOUT AURA WITHOUT STATUS MIGRAINOSUS, NOT INTRACTABLE: ICD-10-CM

## 2018-10-30 RX ORDER — PROPRANOLOL HYDROCHLORIDE 80 MG/1
80 CAPSULE, EXTENDED RELEASE ORAL DAILY
Qty: 90 CAPSULE | Refills: 2 | Status: SHIPPED | OUTPATIENT
Start: 2018-10-30 | End: 2019-07-26

## 2019-03-12 ENCOUNTER — OFFICE VISIT (OUTPATIENT)
Dept: INTERNAL MEDICINE | Facility: CLINIC | Age: 39
End: 2019-03-12
Payer: COMMERCIAL

## 2019-03-12 VITALS
HEART RATE: 65 BPM | BODY MASS INDEX: 36.19 KG/M2 | OXYGEN SATURATION: 96 % | DIASTOLIC BLOOD PRESSURE: 80 MMHG | WEIGHT: 224.2 LBS | SYSTOLIC BLOOD PRESSURE: 127 MMHG

## 2019-03-12 DIAGNOSIS — F32.89 OTHER DEPRESSION: ICD-10-CM

## 2019-03-12 DIAGNOSIS — E78.00 HIGH BLOOD CHOLESTEROL: Primary | ICD-10-CM

## 2019-03-12 DIAGNOSIS — E78.00 HIGH BLOOD CHOLESTEROL: ICD-10-CM

## 2019-03-12 DIAGNOSIS — Z23 NEED FOR PROPHYLACTIC VACCINATION AND INOCULATION AGAINST INFLUENZA: ICD-10-CM

## 2019-03-12 LAB
ALBUMIN SERPL-MCNC: 3.9 G/DL (ref 3.4–5)
ALP SERPL-CCNC: 75 U/L (ref 40–150)
ALT SERPL W P-5'-P-CCNC: 20 U/L (ref 0–50)
ANION GAP SERPL CALCULATED.3IONS-SCNC: 7 MMOL/L (ref 3–14)
AST SERPL W P-5'-P-CCNC: 10 U/L (ref 0–45)
BASOPHILS # BLD AUTO: 0 10E9/L (ref 0–0.2)
BASOPHILS NFR BLD AUTO: 0.3 %
BILIRUB SERPL-MCNC: 0.5 MG/DL (ref 0.2–1.3)
BUN SERPL-MCNC: 5 MG/DL (ref 7–30)
CALCIUM SERPL-MCNC: 8.4 MG/DL (ref 8.5–10.1)
CHLORIDE SERPL-SCNC: 105 MMOL/L (ref 94–109)
CHOLEST SERPL-MCNC: 158 MG/DL
CO2 SERPL-SCNC: 23 MMOL/L (ref 20–32)
CREAT SERPL-MCNC: 0.69 MG/DL (ref 0.52–1.04)
DIFFERENTIAL METHOD BLD: ABNORMAL
EOSINOPHIL # BLD AUTO: 0.4 10E9/L (ref 0–0.7)
EOSINOPHIL NFR BLD AUTO: 3.1 %
ERYTHROCYTE [DISTWIDTH] IN BLOOD BY AUTOMATED COUNT: 12.8 % (ref 10–15)
GFR SERPL CREATININE-BSD FRML MDRD: >90 ML/MIN/{1.73_M2}
GLUCOSE SERPL-MCNC: 92 MG/DL (ref 70–99)
HCG SERPL QL: NEGATIVE
HCT VFR BLD AUTO: 44.8 % (ref 35–47)
HDLC SERPL-MCNC: 46 MG/DL
HGB BLD-MCNC: 14.7 G/DL (ref 11.7–15.7)
IMM GRANULOCYTES # BLD: 0 10E9/L (ref 0–0.4)
IMM GRANULOCYTES NFR BLD: 0.3 %
LDLC SERPL CALC-MCNC: 92 MG/DL
LYMPHOCYTES # BLD AUTO: 4 10E9/L (ref 0.8–5.3)
LYMPHOCYTES NFR BLD AUTO: 32.8 %
MCH RBC QN AUTO: 28.6 PG (ref 26.5–33)
MCHC RBC AUTO-ENTMCNC: 32.8 G/DL (ref 31.5–36.5)
MCV RBC AUTO: 87 FL (ref 78–100)
MONOCYTES # BLD AUTO: 0.5 10E9/L (ref 0–1.3)
MONOCYTES NFR BLD AUTO: 4.2 %
NEUTROPHILS # BLD AUTO: 7.2 10E9/L (ref 1.6–8.3)
NEUTROPHILS NFR BLD AUTO: 59.3 %
NONHDLC SERPL-MCNC: 112 MG/DL
NRBC # BLD AUTO: 0 10*3/UL
NRBC BLD AUTO-RTO: 0 /100
PLATELET # BLD AUTO: 350 10E9/L (ref 150–450)
POTASSIUM SERPL-SCNC: 4.1 MMOL/L (ref 3.4–5.3)
PROT SERPL-MCNC: 7.2 G/DL (ref 6.8–8.8)
RBC # BLD AUTO: 5.14 10E12/L (ref 3.8–5.2)
SODIUM SERPL-SCNC: 134 MMOL/L (ref 133–144)
TRIGL SERPL-MCNC: 100 MG/DL
WBC # BLD AUTO: 12.1 10E9/L (ref 4–11)

## 2019-03-12 ASSESSMENT — PAIN SCALES - GENERAL: PAINLEVEL: NO PAIN (0)

## 2019-03-12 NOTE — NURSING NOTE
Chief Complaint   Patient presents with     Recheck Medication     here for med check, also questions aboput doing blood tests here for [place]       Juice Parr, EMT 10:46 AM on 3/12/2019.

## 2019-03-12 NOTE — NURSING NOTE
Majo Oreilly      1.  Has the patient received the information for the influenza vaccine? YES    2.  Does the patient have any of the following contraindications?     Allergy to eggs? No     Allergic reaction to previous influenza vaccines? No     Any other problems to previous influenza vaccines? No     Paralyzed by Guillain-[place] syndrome? No     Currently pregnant? NO     Current moderate or severe illness? No     Allergy to contact lens solution? No    3.  The vaccine has been administered in the usual fashion and the patient was instructed to wait 20 minutes before leaving the building in the event of an allergic reaction: YES    Recorded by Juiec Parr

## 2019-03-12 NOTE — PROGRESS NOTES
HPI:    Pt. Comes in for follow up She had issues  with   outside  R breast imaging showing a cyst.  On her mother's side there is a very strong h/o breast cancer at a young age. She o/w denies additional HEENT, cardiopulmonary, abdominal, , GYN, neurological, skin complaints. She has h/o migraines and has been on medication for several years. She was seen Neurology for this 7/27/16 and 9/16 and is stable. She has been on same dose of Prozac for about 5 years.     PMH:    1. Smoking  2. PCOS  3. Laparoscopic dermatoid tumor removal in 2005  4. Increased cholesterol on statin    Fam. Hx. As above for several with early breast cancer    PE:    Vitals noted gen nad cooperative alert, HEENT, ears normal oropharynx clear no exudate, neck supple nl rom no adenopathy, LCTA, B, normal resp. System exam, RRR, S1, S2, no MRG, abdomen, nt, nd, no masses, normal GI system exam. Grossly normal neurological exam.     A/P:    1. She has follow up appointment with Dr. [facility] 12/19/16 and will get repeat breast imaging at age 40 (4 years)  2. She was seen in  GYN for management of PCOS 8/3/16  3. She is down to 2 cigarettes a day now  4. Increased cholesterol; she saw  Dr. Solomon, Cardiology 10/16. She had to stop Lipitor because of side effects and now is on Simvastatin (for about 3 weeks). No side effects. Labs checked 8/17. Ordered labs today and refilled Simvastatin. She is aware she can't be pregnant on this medication.   5. She was seen in  Neurology  for migraines 7/16 and 9/16 and is doing better on medication. She states rare migraines now. Usually once a month (when before about 5 times a month)  6. She remains on Fluoxetine low dose w/o problems. Placed Psychiatry referral to Dr. Clark. As noted she is considering in vitro fertilization at [facility].  7. Seen at [facility] (see note in Care Everywhere 7/11/2018; she had imaging, MRI/pelvic U/S and lab tests). Surgery on 8/6/2018  at [facility] for  ovarian cyst and possible partial hysterectomy. See note from [facility] for fertility and possible IVF 2/13/2019. We discussed that she had reviewed being on her current medications for any IVF procedure. She states during treatment she may need every other day estradiol levels that could be drawn here.   8. Flu shot today 3/12/2019.           Total time spent 25  minutes.  More than 50% of the time spent with Ms. Oreilly on counseling / coordinating her care

## 2019-03-12 NOTE — PATIENT INSTRUCTIONS
[facility] Medication Refill Request Information:  * Please contact your pharmacy regarding ANY request for medication refills.  ** [facility] Prescription Fax = [phone]  * Please allow 3 business days for routine medication refills.  * Please allow 5 business days for controlled substance medication refills.     [facility] Test Result notification information:  *You will be notified with in 7-10 days of your appointment day regarding the results of your test.  If you are on MyChart you will be notified as soon as the provider has reviewed the results and signed off on them.    [facility]: [phone]     [facility] (Dr. Weaver and Dr. Clark): [phone]

## 2019-03-18 ENCOUNTER — TELEPHONE (OUTPATIENT)
Dept: BEHAVIORAL HEALTH | Facility: CLINIC | Age: 39
End: 2019-03-18

## 2019-03-18 NOTE — TELEPHONE ENCOUNTER
Three attempts were made to contact client regarding Dr. Walker's referral to see Dr. Clark to discuss antidepressants and pregnancy.    No further attempts will be made at this time to contact client unless otherwise advised.

## 2019-07-26 DIAGNOSIS — G43.709 CHRONIC MIGRAINE WITHOUT AURA WITHOUT STATUS MIGRAINOSUS, NOT INTRACTABLE: ICD-10-CM

## 2019-07-28 RX ORDER — PROPRANOLOL HYDROCHLORIDE 80 MG/1
80 CAPSULE, EXTENDED RELEASE ORAL DAILY
Qty: 90 CAPSULE | Refills: 2 | Status: SHIPPED | OUTPATIENT
Start: 2019-07-28 | End: 2019-11-08

## 2019-09-13 DIAGNOSIS — G43.009 NONINTRACTABLE MIGRAINE, UNSPECIFIED MIGRAINE TYPE: ICD-10-CM

## 2019-09-13 DIAGNOSIS — F32.89 OTHER DEPRESSION: ICD-10-CM

## 2019-09-13 DIAGNOSIS — E78.5 HYPERLIPIDEMIA LDL GOAL <100: Primary | ICD-10-CM

## 2019-09-16 RX ORDER — FLUOXETINE 10 MG/1
CAPSULE ORAL
Qty: 90 CAPSULE | Refills: 1 | Status: SHIPPED | OUTPATIENT
Start: 2019-09-16 | End: 2019-11-08

## 2019-09-16 RX ORDER — SIMVASTATIN 20 MG
20 TABLET ORAL AT BEDTIME
Qty: 90 TABLET | Refills: 1 | Status: SHIPPED | OUTPATIENT
Start: 2019-09-16 | End: 2019-11-08

## 2019-09-16 NOTE — TELEPHONE ENCOUNTER
Last Clinic Visit: Primary care clinic 3/12/19  Per Dr Walker's clinic note, patient has been on same dose of Prozac without problems (No PHQ9 score)

## 2019-11-08 ENCOUNTER — OFFICE VISIT (OUTPATIENT)
Dept: INTERNAL MEDICINE | Facility: CLINIC | Age: 39
End: 2019-11-08
Payer: COMMERCIAL

## 2019-11-08 VITALS
OXYGEN SATURATION: 97 % | HEART RATE: 80 BPM | WEIGHT: 228 LBS | BODY MASS INDEX: 36.8 KG/M2 | DIASTOLIC BLOOD PRESSURE: 89 MMHG | SYSTOLIC BLOOD PRESSURE: 136 MMHG

## 2019-11-08 DIAGNOSIS — G43.009 NONINTRACTABLE MIGRAINE, UNSPECIFIED MIGRAINE TYPE: ICD-10-CM

## 2019-11-08 DIAGNOSIS — G43.009 MIGRAINE WITHOUT AURA AND WITHOUT STATUS MIGRAINOSUS, NOT INTRACTABLE: ICD-10-CM

## 2019-11-08 DIAGNOSIS — E78.5 HYPERLIPIDEMIA LDL GOAL <100: ICD-10-CM

## 2019-11-08 DIAGNOSIS — G43.709 CHRONIC MIGRAINE WITHOUT AURA WITHOUT STATUS MIGRAINOSUS, NOT INTRACTABLE: ICD-10-CM

## 2019-11-08 DIAGNOSIS — F32.89 OTHER DEPRESSION: ICD-10-CM

## 2019-11-08 RX ORDER — ALMOTRIPTAN 12.5 MG/1
12.5 TABLET, FILM COATED ORAL DAILY PRN
Qty: 36 TABLET | Refills: 3 | Status: SHIPPED | OUTPATIENT
Start: 2019-11-08 | End: 2021-05-10

## 2019-11-08 RX ORDER — FLUOXETINE 10 MG/1
CAPSULE ORAL
Qty: 90 CAPSULE | Refills: 3 | Status: SHIPPED | OUTPATIENT
Start: 2019-11-08 | End: 2019-12-03

## 2019-11-08 RX ORDER — SIMVASTATIN 20 MG
20 TABLET ORAL AT BEDTIME
Qty: 90 TABLET | Refills: 3 | Status: SHIPPED | OUTPATIENT
Start: 2019-11-08 | End: 2021-01-14

## 2019-11-08 RX ORDER — PROPRANOLOL HYDROCHLORIDE 80 MG/1
80 CAPSULE, EXTENDED RELEASE ORAL DAILY
Qty: 90 CAPSULE | Refills: 3 | Status: SHIPPED | OUTPATIENT
Start: 2019-11-08 | End: 2021-02-11

## 2019-11-08 ASSESSMENT — PAIN SCALES - GENERAL: PAINLEVEL: NO PAIN (0)

## 2019-11-08 ASSESSMENT — ANXIETY QUESTIONNAIRES
3. WORRYING TOO MUCH ABOUT DIFFERENT THINGS: NOT AT ALL
IF YOU CHECKED OFF ANY PROBLEMS ON THIS QUESTIONNAIRE, HOW DIFFICULT HAVE THESE PROBLEMS MADE IT FOR YOU TO DO YOUR WORK, TAKE CARE OF THINGS AT HOME, OR GET ALONG WITH OTHER PEOPLE: NOT DIFFICULT AT ALL
6. BECOMING EASILY ANNOYED OR IRRITABLE: SEVERAL DAYS
1. FEELING NERVOUS, ANXIOUS, OR ON EDGE: NOT AT ALL
5. BEING SO RESTLESS THAT IT IS HARD TO SIT STILL: NOT AT ALL
GAD7 TOTAL SCORE: 1
7. FEELING AFRAID AS IF SOMETHING AWFUL MIGHT HAPPEN: NOT AT ALL
2. NOT BEING ABLE TO STOP OR CONTROL WORRYING: NOT AT ALL

## 2019-11-08 ASSESSMENT — PATIENT HEALTH QUESTIONNAIRE - PHQ9
5. POOR APPETITE OR OVEREATING: NOT AT ALL
SUM OF ALL RESPONSES TO PHQ QUESTIONS 1-9: 3

## 2019-11-08 NOTE — PROGRESS NOTES
HPI:    Pt. Comes in for follow up She had issues with outside  R breast imaging showing a cyst.  On her mother's side there is a very strong h/o breast cancer at a young age. She o/w denies additional HEENT, cardiopulmonary, abdominal, , GYN, neurological, skin complaints. She has h/o migraines and has been on medication for several years. She was seen Neurology for this 7/27/16 and 9/16 and is stable. She has been on same dose of Prozac for about 5 years. She is followed for fertility at the [facility] (see Care Everywhere notes from 10/2019).     PMH:    1. Smoking  2. PCOS  3. Laparoscopic dermatoid tumor removal in 2005  4. Increased cholesterol on statin    Fam. Hx. As above for several with early breast cancer    PE:    Vitals noted gen nad cooperative alert, HEENT, ears normal oropharynx clear no exudate, neck supple nl rom no adenopathy, LCTA, B, normal resp. System exam, RRR, S1, S2, no MRG, abdomen, nt, nd, no masses, normal GI system exam. Grossly normal neurological exam.     A/P:    1. She has follow up appointment with Dr. [facility] 12/19/16 and will get repeat breast imaging at age 40 (4 years)  2. She was seen in  GYN for management of PCOS 8/3/16  3. She is still smoking but less  4. Increased cholesterol; she saw  Dr. Solomon, Cardiology 10/16. She had to stop Lipitor because of side effects and now is on Simvastatin (for about 3 weeks). No side effects. Labs checked 8/17. Ordered labs today and refilled Simvastatin. She is aware she can't be pregnant on this medication. We discussed this again today (11/8/2019) and she will stop medication and inform me as well as her fertility provider(s)   5. She was seen in  Neurology  for migraines 7/16 and 9/16 and is doing better on medication. She states rare migraines now. Usually once a month (when before about 5 times a month)  6. She remains on Fluoxetine low dose w/o problems. Placed Psychiatry referral to Dr. Clark. As noted she is  considering in vitro fertilization at [facility].  7. Seen at [facility] (see note in Care Everywhere 7/11/2018; she had imaging, MRI/pelvic U/S and lab tests). Surgery on 8/6/2018  at [place] for ovarian cyst and possible partial hysterectomy. See note from [facility] GYN for fertility and possible IVF 2/13/2019. We discussed that she had reviewed being on her current medications for any IVF procedure. She states during treatment she may need every other day estradiol levels that could be drawn here.             Total time spent 25  minutes.  More than 50% of the time spent with Ms. Oreilly on counseling / coordinating her care

## 2019-11-08 NOTE — NURSING NOTE
Chief Complaint   Patient presents with     Refill Request     medcation refill     Kimberly Nissen, EMT at 8:19 AM on 11/8/2019

## 2019-11-09 ASSESSMENT — ANXIETY QUESTIONNAIRES: GAD7 TOTAL SCORE: 1
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
## 2019-12-03 ENCOUNTER — OFFICE VISIT (OUTPATIENT)
Dept: BEHAVIORAL HEALTH | Facility: CLINIC | Age: 39
End: 2019-12-03
Payer: COMMERCIAL

## 2019-12-03 VITALS — WEIGHT: 230 LBS | BODY MASS INDEX: 37.12 KG/M2 | SYSTOLIC BLOOD PRESSURE: 138 MMHG | DIASTOLIC BLOOD PRESSURE: 93 MMHG

## 2019-12-03 DIAGNOSIS — G47.00 INSOMNIA, UNSPECIFIED TYPE: ICD-10-CM

## 2019-12-03 DIAGNOSIS — F33.1 MODERATE EPISODE OF RECURRENT MAJOR DEPRESSIVE DISORDER (H): Primary | ICD-10-CM

## 2019-12-03 DIAGNOSIS — F33.8 SEASONAL AFFECTIVE DISORDER (H): ICD-10-CM

## 2019-12-03 DIAGNOSIS — F17.200 TOBACCO USE DISORDER: ICD-10-CM

## 2019-12-03 RX ORDER — LANOLIN ALCOHOL/MO/W.PET/CERES
3 CREAM (GRAM) TOPICAL EVERY EVENING
COMMUNITY
Start: 2019-12-03 | End: 2023-07-24

## 2019-12-03 ASSESSMENT — ANXIETY QUESTIONNAIRES
5. BEING SO RESTLESS THAT IT IS HARD TO SIT STILL: NOT AT ALL
3. WORRYING TOO MUCH ABOUT DIFFERENT THINGS: SEVERAL DAYS
GAD7 TOTAL SCORE: 2
7. FEELING AFRAID AS IF SOMETHING AWFUL MIGHT HAPPEN: NOT AT ALL
2. NOT BEING ABLE TO STOP OR CONTROL WORRYING: SEVERAL DAYS
1. FEELING NERVOUS, ANXIOUS, OR ON EDGE: NOT AT ALL
GAD7 TOTAL SCORE: 2
4. TROUBLE RELAXING: NOT AT ALL
6. BECOMING EASILY ANNOYED OR IRRITABLE: NOT AT ALL
GAD7 TOTAL SCORE: 2
7. FEELING AFRAID AS IF SOMETHING AWFUL MIGHT HAPPEN: NOT AT ALL

## 2019-12-03 ASSESSMENT — PATIENT HEALTH QUESTIONNAIRE - PHQ9
SUM OF ALL RESPONSES TO PHQ QUESTIONS 1-9: 6
10. IF YOU CHECKED OFF ANY PROBLEMS, HOW DIFFICULT HAVE THESE PROBLEMS MADE IT FOR YOU TO DO YOUR WORK, TAKE CARE OF THINGS AT HOME, OR GET ALONG WITH OTHER PEOPLE: SOMEWHAT DIFFICULT
SUM OF ALL RESPONSES TO PHQ QUESTIONS 1-9: 6

## 2019-12-03 NOTE — NURSING NOTE
Chief Complaint   Patient presents with     Consult     depression     Would like out of today's visit:    Medication Consultation would like an adjustment for depressive symptoms. Is interested in finding a therapist.     Alejandra Quinones LPN

## 2019-12-03 NOTE — PATIENT INSTRUCTIONS
Increase Prozac to 20mg.     Try taking melatonin 1-3 mg, consistently around 3-4 hours prior to intended bedtime.     Use lightbox around 7-8 am for 30 minutes to get desired clinical effect: Can help with circadian adjustment and seasonal depression symptoms. Can also use around midday ~12-1pm.       Scheduling: If you have any concerns about today's visit or wish to schedule another appointment please call our office during normal business hours [phone] (8-5:00 M-F)    Contact Us: Please call [phone] during business hours (8-5:00 M-F).  If after clinic hours, or on the weekend, please call  [phone].    Financial Assistance [phone]  [employer] Billing [phone]  [employer] Billing [phone]  Medical Records [phone]    MENTAL HEALTH CRISIS NUMBERS:  [facility]:  [facility] - [phone]  Crisis Residence [facility] - [phone]  Walk-In Counseling [facility] [phone]  COPE 24/7 [place] Mobile Team - [[phone]]  Crisis Connection - [phone]      [facility]:  [facility] - [phone]  Walk-in counseling [facility] - [phone]  Walk-in counseling [facility] - [phone]  [facility] Residence [facility] - [phone]  Urgent Care Adult Mental Health:   --Drop-in, 24/7 crisis line, and [facility] Mobile Team [[phone]]    24/7 CRISIS TEXT LINE: www.crisistextline.org OR text 073-971 anywhere, anytime, any crisis    Poison Control Center - [phone]  [facility] - [phone]; or [employer] Lifeline - 1-467.605.1322    If you have a medical emergency please call 911 or go to the nearest ER.      Call [facility] to find a therapist: [phone]

## 2019-12-04 ASSESSMENT — PATIENT HEALTH QUESTIONNAIRE - PHQ9: SUM OF ALL RESPONSES TO PHQ QUESTIONS 1-9: 6

## 2019-12-04 ASSESSMENT — ANXIETY QUESTIONNAIRES: GAD7 TOTAL SCORE: 2

## 2020-02-04 ENCOUNTER — OFFICE VISIT (OUTPATIENT)
Dept: BEHAVIORAL HEALTH | Facility: CLINIC | Age: 40
End: 2020-02-04
Payer: COMMERCIAL

## 2020-02-04 VITALS
HEART RATE: 59 BPM | DIASTOLIC BLOOD PRESSURE: 74 MMHG | BODY MASS INDEX: 37.3 KG/M2 | SYSTOLIC BLOOD PRESSURE: 115 MMHG | WEIGHT: 231.1 LBS

## 2020-02-04 DIAGNOSIS — F33.40 RECURRENT MAJOR DEPRESSIVE DISORDER, IN REMISSION (H): Primary | ICD-10-CM

## 2020-02-04 DIAGNOSIS — F17.200 TOBACCO USE DISORDER: ICD-10-CM

## 2020-02-04 ASSESSMENT — PATIENT HEALTH QUESTIONNAIRE - PHQ9
10. IF YOU CHECKED OFF ANY PROBLEMS, HOW DIFFICULT HAVE THESE PROBLEMS MADE IT FOR YOU TO DO YOUR WORK, TAKE CARE OF THINGS AT HOME, OR GET ALONG WITH OTHER PEOPLE: NOT DIFFICULT AT ALL
SUM OF ALL RESPONSES TO PHQ QUESTIONS 1-9: 1
SUM OF ALL RESPONSES TO PHQ QUESTIONS 1-9: 1

## 2020-02-04 ASSESSMENT — ANXIETY QUESTIONNAIRES
GAD7 TOTAL SCORE: 2
5. BEING SO RESTLESS THAT IT IS HARD TO SIT STILL: NOT AT ALL
GAD7 TOTAL SCORE: 2
6. BECOMING EASILY ANNOYED OR IRRITABLE: NOT AT ALL
4. TROUBLE RELAXING: NOT AT ALL
7. FEELING AFRAID AS IF SOMETHING AWFUL MIGHT HAPPEN: NOT AT ALL
GAD7 TOTAL SCORE: 2
7. FEELING AFRAID AS IF SOMETHING AWFUL MIGHT HAPPEN: NOT AT ALL
1. FEELING NERVOUS, ANXIOUS, OR ON EDGE: NOT AT ALL
2. NOT BEING ABLE TO STOP OR CONTROL WORRYING: SEVERAL DAYS
3. WORRYING TOO MUCH ABOUT DIFFERENT THINGS: SEVERAL DAYS

## 2020-02-04 NOTE — PATIENT INSTRUCTIONS
Would recommend establishing with a therapist. May contact us for availability here, or insurance company for options that are convenient and covered.     Light box prescription is likely good for about 6 months, consider obtaining it before then in case it could be beneficial in the future.     If at any point you would like to consider working with the Women's Well Being program at [facility] regarding pre or post pregnancy related issues, please contact me and I can send a referral.       Scheduling: If you have any concerns about today's visit or wish to schedule another appointment please call our office during normal business hours [phone] (8-5:00 M-F)    Contact Us: Please call [phone] during business hours (8-5:00 M-F).  If after clinic hours, or on the weekend, please call  [phone].    Financial Assistance [phone]  [facility] [employer] Billing [phone]  "[employer] Billing [phone]  Medical Records [phone]    MENTAL HEALTH CRISIS NUMBERS:  [facility]:  [facility] - [phone]  Crisis Residence [facility] - [phone]  Walk-In Counseling [facility] [phone]  COPE 24/7 [place] Mobile Team - [[phone]]  Crisis Connection - [phone]    [facility]:  [facility] - [phone]  Walk-in counseling [facility] - [phone]  Walk-in counseling [facility] - [phone]  Crisis Residence [facility] - [phone]  Urgent Care Adult Mental Health:   --Drop-in, 24/7 crisis line, and [facility] Mobile Team [[phone]]    24/7 CRISIS TEXT LINE: www.crisistextline.org OR text 983-585 anywhere, anytime, any crisis    Poison Control Center - [phone]  [facility] - 1-929-519-0797; or [facility] Lifeline - 1-457.694.7414    If you have a medical emergency please call 911 or go to the nearest ER.

## 2020-02-04 NOTE — NURSING NOTE
Chief Complaint   Patient presents with     Recheck Medication     SAD insomnia MDD     Would like out of today's visit:  Med check. Reports improvement.  Alejandra Quinones LPN
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
## 2020-02-04 NOTE — Clinical Note
Majo Gilmore is seeing you in a few days. She's doing better, so you can continue the Prozac for her. I did forget to discuss smoking cessation with her again today. I did recommend that she consider bupropion for this, see my note for details.

## 2020-02-05 ASSESSMENT — PATIENT HEALTH QUESTIONNAIRE - PHQ9: SUM OF ALL RESPONSES TO PHQ QUESTIONS 1-9: 1

## 2020-02-05 ASSESSMENT — ANXIETY QUESTIONNAIRES: GAD7 TOTAL SCORE: 2

## 2020-02-07 ENCOUNTER — OFFICE VISIT (OUTPATIENT)
Dept: INTERNAL MEDICINE | Facility: CLINIC | Age: 40
End: 2020-02-07
Payer: COMMERCIAL

## 2020-02-07 VITALS
OXYGEN SATURATION: 96 % | BODY MASS INDEX: 36.96 KG/M2 | DIASTOLIC BLOOD PRESSURE: 77 MMHG | SYSTOLIC BLOOD PRESSURE: 112 MMHG | WEIGHT: 229 LBS | HEART RATE: 61 BPM

## 2020-02-07 DIAGNOSIS — E78.5 HYPERLIPIDEMIA LDL GOAL <100: ICD-10-CM

## 2020-02-07 DIAGNOSIS — E78.5 HYPERLIPIDEMIA LDL GOAL <100: Primary | ICD-10-CM

## 2020-02-07 LAB
ALBUMIN SERPL-MCNC: 3.6 G/DL (ref 3.4–5)
ALP SERPL-CCNC: 78 U/L (ref 40–150)
ALT SERPL W P-5'-P-CCNC: 24 U/L (ref 0–50)
ANION GAP SERPL CALCULATED.3IONS-SCNC: 7 MMOL/L (ref 3–14)
AST SERPL W P-5'-P-CCNC: 7 U/L (ref 0–45)
BASOPHILS # BLD AUTO: 0 10E9/L (ref 0–0.2)
BASOPHILS NFR BLD AUTO: 0.3 %
BILIRUB SERPL-MCNC: 0.4 MG/DL (ref 0.2–1.3)
BUN SERPL-MCNC: 9 MG/DL (ref 7–30)
CALCIUM SERPL-MCNC: 8.6 MG/DL (ref 8.5–10.1)
CHLORIDE SERPL-SCNC: 109 MMOL/L (ref 94–109)
CHOLEST SERPL-MCNC: 151 MG/DL
CO2 SERPL-SCNC: 23 MMOL/L (ref 20–32)
CREAT SERPL-MCNC: 0.74 MG/DL (ref 0.52–1.04)
DIFFERENTIAL METHOD BLD: NORMAL
EOSINOPHIL # BLD AUTO: 0.3 10E9/L (ref 0–0.7)
EOSINOPHIL NFR BLD AUTO: 2.5 %
ERYTHROCYTE [DISTWIDTH] IN BLOOD BY AUTOMATED COUNT: 13.2 % (ref 10–15)
FERRITIN SERPL-MCNC: 40 NG/ML (ref 12–150)
GFR SERPL CREATININE-BSD FRML MDRD: >90 ML/MIN/{1.73_M2}
GLUCOSE SERPL-MCNC: 91 MG/DL (ref 70–99)
HCG SERPL QL: NEGATIVE
HCT VFR BLD AUTO: 45.5 % (ref 35–47)
HDLC SERPL-MCNC: 47 MG/DL
HGB BLD-MCNC: 15.4 G/DL (ref 11.7–15.7)
IMM GRANULOCYTES # BLD: 0 10E9/L (ref 0–0.4)
IMM GRANULOCYTES NFR BLD: 0.4 %
IRON SATN MFR SERPL: 35 % (ref 15–46)
IRON SERPL-MCNC: 123 UG/DL (ref 35–180)
LDLC SERPL CALC-MCNC: 80 MG/DL
LYMPHOCYTES # BLD AUTO: 3.6 10E9/L (ref 0.8–5.3)
LYMPHOCYTES NFR BLD AUTO: 35.8 %
MCH RBC QN AUTO: 29.7 PG (ref 26.5–33)
MCHC RBC AUTO-ENTMCNC: 33.8 G/DL (ref 31.5–36.5)
MCV RBC AUTO: 88 FL (ref 78–100)
MONOCYTES # BLD AUTO: 0.5 10E9/L (ref 0–1.3)
MONOCYTES NFR BLD AUTO: 5.1 %
NEUTROPHILS # BLD AUTO: 5.6 10E9/L (ref 1.6–8.3)
NEUTROPHILS NFR BLD AUTO: 55.9 %
NONHDLC SERPL-MCNC: 104 MG/DL
NRBC # BLD AUTO: 0 10*3/UL
NRBC BLD AUTO-RTO: 0 /100
PLATELET # BLD AUTO: 384 10E9/L (ref 150–450)
POTASSIUM SERPL-SCNC: 4.4 MMOL/L (ref 3.4–5.3)
PROT SERPL-MCNC: 7 G/DL (ref 6.8–8.8)
RBC # BLD AUTO: 5.19 10E12/L (ref 3.8–5.2)
SODIUM SERPL-SCNC: 139 MMOL/L (ref 133–144)
TIBC SERPL-MCNC: 351 UG/DL (ref 240–430)
TRIGL SERPL-MCNC: 118 MG/DL
TSH SERPL DL<=0.005 MIU/L-ACNC: 2.02 MU/L (ref 0.4–4)
WBC # BLD AUTO: 10 10E9/L (ref 4–11)

## 2020-02-07 ASSESSMENT — PAIN SCALES - GENERAL: PAINLEVEL: NO PAIN (0)

## 2020-02-07 NOTE — PROGRESS NOTES
Chief Complaint: Routine follow-up.    SUBJECTIVE    HPI:  Patient is 39 year old female presenting for routine follow-up. She reports a recent history of upper respiratory illness including symptoms of fever, chills, myalgia and cough. She was never tested but believes she may have had influenza. She is still experiencing and occasional cough and significant fatigue. Majo is in the process of pursuing IVF at [facility]. 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 She hopes to begin the IVF process within the next few months. She is aware she will have to discontinue Simvastatin if she becomes pregnant. PMH is significant for depression. Patient recently saw Dr. Clark on 2/4/20 and continues on Prozac 20mg daily as well as melatonin for sleep. She has planned follow-up with Dr. Clark in 2 months. She continues to work on smoking cessation and reports using two cigarettes per day.    PMH:  Past Medical History:   Diagnosis Date     Alopecia      Combined hyperlipidemia      Depression      Migraines      PCOS (polycystic ovarian syndrome)      PTSD (post-traumatic stress disorder)        PSH:  Past Surgical History:   Procedure Laterality Date     EXTRACTION(S) DENTAL       GYN SURGERY  [facility] 2007    had ovarian cyst removed, had second surgery approx 2012     LASIK BILATERAL       left salpingo       ORTHOPEDIC SURGERY  approx 2009    knee surgery     SALPINGO OOPHORECTOMY,R/L/MARIBEL      left side       Medications:  Current Outpatient Medications   Medication     almotriptan (AXERT) 12.5 MG tablet     FLUoxetine (PROZAC) 20 MG capsule     IBUPROFEN PO     melatonin 3 MG tablet     norgestimate-ethinyl estradiol (ORTHO-CYCLEN, SPRINTEC) 0.25-35 MG-MCG per tablet     order for DME     propranolol ER (INDERAL LA) 80 MG 24 hr capsule     simvastatin (ZOCOR) 20 MG tablet     No current facility-administered medications for this  visit.      Allergies:  Allergies   Allergen Reactions     Ceclor [Cefaclor] Hives     Penicillins Hives     Temovate [Clobetasol]      Doxycycline GI Disturbance     Immunizations:  Immunization History   Administered Date(s) Administered     HPV 04/28/2015     Influenza (IIV3) PF 10/24/2017     Influenza Vaccine IM > 6 months Valent IIV4 03/12/2019     TD (ADULT, 7+) 03/01/2006     Tdap (Adacel,Boostrix) 05/03/2016       FH:  Family History   Problem Relation Age of Onset     Diabetes Father      Diabetes Maternal Grandfather        ROS:  Constitutional: Positive for fatigue.  HEENT: No blurred vision, tearing or redness. No hearing loss or tinnitus. No nasal congestion or epistaxis. No neck stiffness of pain.  Cardiac: No chest pain or palpitations.  Respiratory: Positive for occasional cough.  GI: No nausea, vomiting, diarrhea, constipation, melena, or hematochezia.  Genitourinary: No hesitancy, urgency, dysuria or hematuria.   Musculoskeletal: No joint pain, stiffness or swelling.  Skin/Integumentary: No rashes, dryness, color change or abnormalities of hair or nails  Neurological: No weakness or numbness of extremities, no headache, dizziness, no loss of consciousness.  Psych: No changes in memory, concentration or changes in activities.    OBJECTIVE  /77   Pulse 61   Wt 103.9 kg (229 lb)   SpO2 96%   BMI 36.96 kg/m      Physical Exam:  General: Pleasant female, appears stated age in no acute distress.Alert and cooperative with examination.  HEENT: Head normocephalic without tenderness. Conjunctiva pink, sclera non-icteric. PERRLA. EOM intact. External auditory canals clear. TMs intact without erythema. Nasopharynx mucosa pink and moist without polyps or septal deviation. Moist oropharynx without lesions or exudate. Uvula and tongue midline. Dentition intact. Trachea midline. No thyromegaly. No cervical lymphadenopathy.  Respiratory: Symmetric thoracic expansion. Lungs clear to auscultation without  crackles, wheezes or rhonchi.   Cardiac: RRR, S1 and S2 auscultated without murmurs, click or rubs. No JVD. Peripheral pulses palpable and equal bilaterally.   GI: Abdomen soft and nontender to palpation. Bowel sounds audible x 4.   Musculoskeletal: Full range of motion bilaterally without limitation. Strength equal bilaterally. No joint tenderness to palpation.  Skin: Warm and dry without rashes or lesions.   Neurologic: Alert and oriented.    Pysch: Pleasant mood and affect.       ASSESSMENT/PLAN:  1. Viral illness/fatigue  Patient reports a family history of hypothyroidism and iron deficiency. She denies heavy menstrual periods. Fatigue may be related to recent viral illness. Will check a CBC with diff, TSH, iron panel and ferritin today.  2. Depression  Continue on Prozac and follow dose adjustments per Dr. Clark.   3. Health maintenance  Will check lipid panel today and serum HCG today. Reviewed need to discontinue simvastatin prior to pregnancy. Will be due for screening mammogram once she turns 40.     Follow-up in 6 months.     Sandi Estes RN  Nurse Practitioner Student  [facility]      Staff note; I personally reviewed past medical history, interviewed pt. And conducted physical examination. I agree with the above assessment and plan.    RENY Walker MD    Total time spent 25 minutes.  More than 50% of the time spent with Ms. Oreilly on counseling / coordinating her care

## 2020-02-07 NOTE — NURSING NOTE
Chief Complaint   Patient presents with     Medication Follow-up     Pt is here to follow up on medications.      Re Bee LPN at 9:48 AM on 2/7/2020.

## 2020-02-12 ENCOUNTER — TELEPHONE (OUTPATIENT)
Dept: BEHAVIORAL HEALTH | Facility: CLINIC | Age: 40
End: 2020-02-12

## 2020-02-12 NOTE — TELEPHONE ENCOUNTER
The following attempts were made by writer to contact client regarding Dr. Clark's referral to see a therapist for treatment of depression:    February 7th, 10th, and 11th phone messages    No further attempts will be made to contact client unless otherwise advised.

## 2020-02-24 ENCOUNTER — HEALTH MAINTENANCE LETTER (OUTPATIENT)
Age: 40
End: 2020-02-24

## 2020-02-27 DIAGNOSIS — F33.1 MODERATE EPISODE OF RECURRENT MAJOR DEPRESSIVE DISORDER (H): ICD-10-CM

## 2020-02-28 NOTE — TELEPHONE ENCOUNTER
"Medication requested: FLUOXETINE 20MG CAPSULES  Take 1 capsule (20 mg) by mouth daily   Last refilled: 12/3/2019  Qty: 90/0      Last seen: 2/4/20 \"Continue Prozac 20mg daily\"  RTC: 8 weeks with Dr Clark for follow up, with plan for transition back to referring provider or designated prescriber within 3 months  Cancel: 0  No-show: 0  Next appt: None    Refill decision:   Refilled for 30 days per protocol.    Overridden by Kishore Clark MD on Dec 3, 2019 10:27 AM   Drug-Drug   1. IBUPROFEN / SELECTIVE SEROTONIN REUPTAKE INHIBITORS [Level: Major]   Other Orders: IBUPROFEN PO      2. IBUPROFEN / SELECTIVE SEROTONIN REUPTAKE INHIBITORS [Level: Major]   Other Orders: IBUPROFEN PO          "

## 2020-03-15 DIAGNOSIS — E78.5 HYPERLIPIDEMIA LDL GOAL <100: ICD-10-CM

## 2020-03-18 RX ORDER — SIMVASTATIN 20 MG
TABLET ORAL
Qty: 90 TABLET | Refills: 3 | OUTPATIENT
Start: 2020-03-18

## 2020-03-26 DIAGNOSIS — F33.1 MODERATE EPISODE OF RECURRENT MAJOR DEPRESSIVE DISORDER (H): ICD-10-CM

## 2020-03-26 NOTE — TELEPHONE ENCOUNTER
Medication requested: FLUOXETINE 20MG CAPSULES   Last refilled: not listed  Qty: 30      Last seen: 2-4-20  RTC: RTC: 8 weeks with Dr. Clark for follow up, with plan for transition back to referring provider or designated prescriber within 3 months  Cancel: none  No-show: none  Next appt: none    Refill decision: 30 day shalonda refill sent to the pharmacy - including instructions for patient to call the clinic and schedule an appointment.    Scheduling has been notified to contact the pt for appointment.      Overridden by Kishore Clark MD on Dec 3, 2019 10:27 AM    Drug-Drug    1. IBUPROFEN / SELECTIVE SEROTONIN REUPTAKE INHIBITORS [Level: Major]    Other Orders:  IBUPROFEN PO       2. IBUPROFEN / SELECTIVE SEROTONIN REUPTAKE INHIBITORS [Level: Major]    Other Orders:  IBUPROFEN PO

## 2020-04-22 ASSESSMENT — PATIENT HEALTH QUESTIONNAIRE - PHQ9
SUM OF ALL RESPONSES TO PHQ QUESTIONS 1-9: 1
SUM OF ALL RESPONSES TO PHQ QUESTIONS 1-9: 1
10. IF YOU CHECKED OFF ANY PROBLEMS, HOW DIFFICULT HAVE THESE PROBLEMS MADE IT FOR YOU TO DO YOUR WORK, TAKE CARE OF THINGS AT HOME, OR GET ALONG WITH OTHER PEOPLE: NOT DIFFICULT AT ALL

## 2020-04-22 ASSESSMENT — ANXIETY QUESTIONNAIRES
7. FEELING AFRAID AS IF SOMETHING AWFUL MIGHT HAPPEN: NOT AT ALL
3. WORRYING TOO MUCH ABOUT DIFFERENT THINGS: NOT AT ALL
5. BEING SO RESTLESS THAT IT IS HARD TO SIT STILL: NOT AT ALL
GAD7 TOTAL SCORE: 1
4. TROUBLE RELAXING: NOT AT ALL
GAD7 TOTAL SCORE: 1
GAD7 TOTAL SCORE: 1
6. BECOMING EASILY ANNOYED OR IRRITABLE: NOT AT ALL
2. NOT BEING ABLE TO STOP OR CONTROL WORRYING: SEVERAL DAYS
7. FEELING AFRAID AS IF SOMETHING AWFUL MIGHT HAPPEN: NOT AT ALL
1. FEELING NERVOUS, ANXIOUS, OR ON EDGE: NOT AT ALL

## 2020-04-23 ENCOUNTER — VIRTUAL VISIT (OUTPATIENT)
Dept: BEHAVIORAL HEALTH | Facility: CLINIC | Age: 40
End: 2020-04-23
Payer: COMMERCIAL

## 2020-04-23 DIAGNOSIS — F17.200 TOBACCO USE DISORDER: ICD-10-CM

## 2020-04-23 DIAGNOSIS — F33.40 RECURRENT MAJOR DEPRESSIVE DISORDER, IN REMISSION (H): Primary | ICD-10-CM

## 2020-04-23 ASSESSMENT — PATIENT HEALTH QUESTIONNAIRE - PHQ9: SUM OF ALL RESPONSES TO PHQ QUESTIONS 1-9: 1

## 2020-04-23 ASSESSMENT — ANXIETY QUESTIONNAIRES: GAD7 TOTAL SCORE: 1

## 2020-04-23 NOTE — NURSING NOTE
Chief Complaint   Patient presents with     Recheck Medication     MDD SAD     Would like out of today's visit:  Check in and refills  Alejandra Quinones LPN

## 2020-04-23 NOTE — PATIENT INSTRUCTIONS
Scheduling: If you have any concerns about today's visit or wish to schedule another appointment please call our office during normal business hours [phone] (8-5:00 M-F)    Contact Us: Please call [phone] during business hours (8-5:00 M-F).  If after clinic hours, or on the weekend, please call  [phone].    Financial Assistance [phone]  [place] [facility] [phone]  [facility] [phone]  Medical Records [phone]    MENTAL HEALTH CRISIS NUMBERS:  [facility]:  [facility] - [phone]  Crisis Residence [facility] - [phone]  Walk-In Counseling [facility] [phone]  COPE 24/7 [facility] Mobile Team - [[phone]]    [facility]:  [facility] - [phone]  Walk-in counseling [facility] - [phone]  Walk-in counseling [facility] - [phone]  Crisis Residence [facility] - [phone]  Urgent Care Adult Mental Health:   --Drop-in, 24/7 crisis line, and [facility] Mobile Team [[phone]]    24/7 CRISIS TEXT LINE: www.crisistextline.org OR text 077-526 anywhere, anytime, any crisis    Poison Control Center - [phone]  [facility] [employer] - [phone]; or [employer] - [phone]    If you have a medical emergency please call 911 or go to the nearest ER.
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
## 2020-05-04 DIAGNOSIS — G43.709 CHRONIC MIGRAINE WITHOUT AURA WITHOUT STATUS MIGRAINOSUS, NOT INTRACTABLE: ICD-10-CM

## 2020-05-04 RX ORDER — PROPRANOLOL HYDROCHLORIDE 80 MG/1
CAPSULE, EXTENDED RELEASE ORAL
Qty: 90 CAPSULE | Refills: 3 | OUTPATIENT
Start: 2020-05-04

## 2020-05-07 ENCOUNTER — MYC REFILL (OUTPATIENT)
Dept: INTERNAL MEDICINE | Facility: CLINIC | Age: 40
End: 2020-05-07

## 2020-05-07 DIAGNOSIS — G43.709 CHRONIC MIGRAINE WITHOUT AURA WITHOUT STATUS MIGRAINOSUS, NOT INTRACTABLE: ICD-10-CM

## 2020-05-07 RX ORDER — PROPRANOLOL HYDROCHLORIDE 80 MG/1
80 CAPSULE, EXTENDED RELEASE ORAL DAILY
Qty: 90 CAPSULE | Refills: 3 | Status: CANCELLED | OUTPATIENT
Start: 2020-05-07

## 2020-08-03 ENCOUNTER — TELEPHONE (OUTPATIENT)
Dept: INTERNAL MEDICINE | Facility: CLINIC | Age: 40
End: 2020-08-03

## 2020-08-03 ENCOUNTER — VIRTUAL VISIT (OUTPATIENT)
Dept: INTERNAL MEDICINE | Facility: CLINIC | Age: 40
End: 2020-08-03
Payer: COMMERCIAL

## 2020-08-03 DIAGNOSIS — E78.00 HIGH BLOOD CHOLESTEROL: Primary | ICD-10-CM

## 2020-08-03 NOTE — NURSING NOTE
Chief Complaint   Patient presents with     Recheck Medication     Pt would like to discuss medications      Holly Marcum EMT at 11:30 AM sign on 8/3/2020

## 2020-09-16 DIAGNOSIS — E78.5 HYPERLIPIDEMIA LDL GOAL <100: ICD-10-CM

## 2020-09-18 NOTE — TELEPHONE ENCOUNTER
simvastatin (ZOCOR) 20 MG tablet       Last Written Prescription Date:  11/8/19  Last Fill Quantity: 90,   # refills: 3  Last Virtual Visit : 8/3/20  Future Office visit:  None scheduled    Routing refill request to provider for review/approval because:  Per dictation note:  Last in-person visit with me was 2/7/2020. She had a psychiatry visit 4/23/2020 with Dr. Clrak. She remains on her same medications. She was seen at [facility] fertility clinic 7/17/2020 to consider IVF (notes in Care Everywhere). She had a discussion with her IVF [facility] providers and it is recommended she discontinue her Simvastatin, Propranolol, and Prozac. No other HEENT, cardiopulmonary, abdominal, , GYN, neurological, systemic, psychiatric, lymphatic, endocrine, vascular complaints. She will inform us regarding her IVF care at [facility].    Care Everywhere reviewed and it appears she is not taking the Zocor-will call pt.    Pt called and she stated she started taking the Simvastatin 5 days ago. She will find out in the next week or so if she will be consider for IVF. Pt will keep the clinic/PCP up to date.  Sudha Boykin RN 12:45 PM on 9/24/2020.

## 2020-09-23 RX ORDER — SIMVASTATIN 20 MG
20 TABLET ORAL AT BEDTIME
Qty: 90 TABLET | Refills: 3 | OUTPATIENT
Start: 2020-09-23

## 2020-09-24 ENCOUNTER — TELEPHONE (OUTPATIENT)
Dept: INTERNAL MEDICINE | Facility: CLINIC | Age: 40
End: 2020-09-24

## 2021-01-12 DIAGNOSIS — E78.5 HYPERLIPIDEMIA LDL GOAL <100: ICD-10-CM

## 2021-01-14 RX ORDER — SIMVASTATIN 20 MG
20 TABLET ORAL AT BEDTIME
Qty: 90 TABLET | Refills: 1 | Status: SHIPPED | OUTPATIENT
Start: 2021-01-14 | End: 2021-05-10

## 2021-01-14 NOTE — TELEPHONE ENCOUNTER
Last visit date 8/3/20 Dr Walker [facility] . LDL done 2/7/20

## 2021-01-21 DIAGNOSIS — F33.40 RECURRENT MAJOR DEPRESSIVE DISORDER, IN REMISSION (H): ICD-10-CM

## 2021-01-25 NOTE — TELEPHONE ENCOUNTER
FLUoxetine (PROZAC) 20 MG capsule      Last Written Prescription Date:  7-23-20  Last Fill Quantity: 90,   # refills: 1  Renewal provider: Kishore Clark MD  Last Office Visit : 8-3-20  Future Office visit:  None    Last appt w/ Dr. Clark 4-23-20     6) RTC: Follow up with PCP    Last PCP note  Last in-person visit with me was 2/7/2020.   She had a psychiatry visit 4/23/2020 with Dr. Clark.   She remains on her same medications.   She was seen at [facility] fertility clinic 7/17/2020 to consider IVF   (notes in Care Everywhere).   She had a discussion with her IVF [facility] providers and it is recommended   she discontinue her Simvastatin, Propranolol, and Prozac.   No other HEENT, cardiopulmonary, abdominal, , GYN, neurological,   systemic, psychiatric, lymphatic, endocrine, vascular complaints.   She will inform us regarding her IVF care at [facility].    Reproductive endo note: 12-2-20, 11-5-20 care everywhere    Routing refill request to provider for review/approval because:  ? Provider/service RF  ? Status of IVF tx  FYI overdue PHQ9-

## 2021-02-10 DIAGNOSIS — G43.709 CHRONIC MIGRAINE WITHOUT AURA WITHOUT STATUS MIGRAINOSUS, NOT INTRACTABLE: ICD-10-CM

## 2021-02-11 RX ORDER — PROPRANOLOL HYDROCHLORIDE 80 MG/1
80 CAPSULE, EXTENDED RELEASE ORAL DAILY
Qty: 90 CAPSULE | Refills: 1 | Status: SHIPPED | OUTPATIENT
Start: 2021-02-11 | End: 2021-05-10

## 2021-02-12 NOTE — TELEPHONE ENCOUNTER
propranolol ER (INDERAL LA) 80 MG 24 hr capsule  Take 1 capsule (80 mg) by mouth daily   Last Written Prescription Date:  11/8/2019  Last Fill Quantity: 90,   # refills: 3  Last Office Visit : 8/3/20  Future Office visit:  None  BP check due

## 2021-04-11 DIAGNOSIS — E78.5 HYPERLIPIDEMIA LDL GOAL <100: ICD-10-CM

## 2021-04-13 RX ORDER — SIMVASTATIN 20 MG
20 TABLET ORAL AT BEDTIME
Qty: 90 TABLET | Refills: 1 | OUTPATIENT
Start: 2021-04-13

## 2021-04-17 ENCOUNTER — HEALTH MAINTENANCE LETTER (OUTPATIENT)
Age: 41
End: 2021-04-17

## 2021-04-26 DIAGNOSIS — F33.40 RECURRENT MAJOR DEPRESSIVE DISORDER, IN REMISSION (H): ICD-10-CM

## 2021-04-28 NOTE — TELEPHONE ENCOUNTER
FLUoxetine (PROZAC) 20 MG capsule      Last Written Prescription Date:  1-26-21  Last Fill Quantity: 90,   # refills: 0  Last Office Visit : 8-3-20  Future Office visit:  none      See IVF note: [facility] 12-2-20, 11-5-20, 3-24-21 procedure notes ( care everywhere)    She was seen at [facility] fertility clinic 7/17/2020 to consider IVF   (notes in Care Everywhere).   She had a discussion with her IVF [facility] providers and it is recommended   she discontinue her Simvastatin, Propranolol, and Prozac.     Routing refill request to provider for review/approval because:  Overdue PHQ9- FYI to Clinic  Previous 90 day shalonda    Above notes from [facility]

## 2021-05-10 ENCOUNTER — VIRTUAL VISIT (OUTPATIENT)
Dept: INTERNAL MEDICINE | Facility: CLINIC | Age: 41
End: 2021-05-10
Payer: COMMERCIAL

## 2021-05-10 DIAGNOSIS — G43.709 CHRONIC MIGRAINE WITHOUT AURA WITHOUT STATUS MIGRAINOSUS, NOT INTRACTABLE: ICD-10-CM

## 2021-05-10 DIAGNOSIS — G43.009 MIGRAINE WITHOUT AURA AND WITHOUT STATUS MIGRAINOSUS, NOT INTRACTABLE: ICD-10-CM

## 2021-05-10 DIAGNOSIS — E78.5 HYPERLIPIDEMIA LDL GOAL <100: ICD-10-CM

## 2021-05-10 DIAGNOSIS — E78.00 HIGH BLOOD CHOLESTEROL: Primary | ICD-10-CM

## 2021-05-10 DIAGNOSIS — F33.40 RECURRENT MAJOR DEPRESSIVE DISORDER, IN REMISSION (H): ICD-10-CM

## 2021-05-10 PROCEDURE — 99213 OFFICE O/P EST LOW 20 MIN: CPT | Mod: 95 | Performed by: INTERNAL MEDICINE

## 2021-05-10 RX ORDER — SIMVASTATIN 20 MG
20 TABLET ORAL AT BEDTIME
Qty: 90 TABLET | Refills: 1 | Status: SHIPPED | OUTPATIENT
Start: 2021-05-10 | End: 2022-01-10

## 2021-05-10 RX ORDER — PROPRANOLOL HYDROCHLORIDE 80 MG/1
80 CAPSULE, EXTENDED RELEASE ORAL DAILY
Qty: 90 CAPSULE | Refills: 1 | Status: SHIPPED | OUTPATIENT
Start: 2021-05-10 | End: 2021-08-11

## 2021-05-10 RX ORDER — ALMOTRIPTAN 12.5 MG/1
12.5 TABLET, FILM COATED ORAL DAILY PRN
Qty: 36 TABLET | Refills: 3 | Status: SHIPPED | OUTPATIENT
Start: 2021-05-10 | End: 2023-07-17

## 2021-05-10 ASSESSMENT — PATIENT HEALTH QUESTIONNAIRE - PHQ9: SUM OF ALL RESPONSES TO PHQ QUESTIONS 1-9: 0

## 2021-05-10 NOTE — PROGRESS NOTES
Telephone visit    Telephone note with me 8/30/2020 and additional details in that note.     Ms. Oreilly agrees to a telephone visit    [facility] visit in Care Everywhere 5/5/2021     [facility] note in Care Everywhere 3/24/2021 reproductive medicine. She states she had two rounds of IVF that were not successful. She stopped all her medications during these treatments and is aware of the need to to this if she becomes pregnant. She may consider additional fertilization treatments and again is aware of the need to discontinue her medications (Fluoxetine, Propranolol, Axert, and Simvastatin).     Migraines; she remains on same medication    Depression; she remains on Fluoxetine.     Increased lipids; last labs checked 2/7/2020     Immunizations; recommend COVID vaccination and she states she is going to the first dose of probably the Pfizer vaccine this Friday    Renewed all her medications.     RENY Walker MD    Total time today 5 minutes.

## 2021-05-10 NOTE — NURSING NOTE
Chief Complaint   Patient presents with     Recheck Medication     pt would like to follow up from august 2020       Laurie Paulino CMA, EMT at 10:27 AM on 5/10/2021.

## 2021-07-02 ENCOUNTER — MYC MEDICAL ADVICE (OUTPATIENT)
Dept: INTERNAL MEDICINE | Facility: CLINIC | Age: 41
End: 2021-07-02

## 2021-07-05 NOTE — TELEPHONE ENCOUNTER
Ok to schedule patient in a virtual appointment slot for an inperson appointment with PCP. Mariam Cuevas LPN 7/5/2021 2:08 PM

## 2021-07-10 DIAGNOSIS — E78.5 HYPERLIPIDEMIA LDL GOAL <100: ICD-10-CM

## 2021-07-12 ENCOUNTER — TELEPHONE (OUTPATIENT)
Dept: INTERNAL MEDICINE | Facility: CLINIC | Age: 41
End: 2021-07-12

## 2021-07-12 NOTE — TELEPHONE ENCOUNTER
[facility] Call Center    Phone Message    May a detailed message be left on voicemail: yes     Reason for Call: Order(s): Other:   Reason for requested: Requesting lab orders to test blood since recent orders have   Date needed: as soon as possible  Provider name:       Action Taken: Message routed to:  [facility] primary care clinic    Travel Screening: Not Applicable

## 2021-07-14 RX ORDER — SIMVASTATIN 20 MG
TABLET ORAL
Qty: 90 TABLET | Refills: 1 | OUTPATIENT
Start: 2021-07-14

## 2021-07-14 NOTE — TELEPHONE ENCOUNTER
Extended lab orders. Call patient and left message to let her know. Give phone number to call to schedule.

## 2021-08-09 ENCOUNTER — VIRTUAL VISIT (OUTPATIENT)
Dept: INTERNAL MEDICINE | Facility: CLINIC | Age: 41
End: 2021-08-09
Payer: COMMERCIAL

## 2021-08-09 DIAGNOSIS — G43.709 CHRONIC MIGRAINE WITHOUT AURA WITHOUT STATUS MIGRAINOSUS, NOT INTRACTABLE: ICD-10-CM

## 2021-08-09 DIAGNOSIS — I10 BENIGN ESSENTIAL HYPERTENSION: Primary | ICD-10-CM

## 2021-08-09 PROCEDURE — 99213 OFFICE O/P EST LOW 20 MIN: CPT | Mod: 95 | Performed by: INTERNAL MEDICINE

## 2021-08-09 NOTE — PROGRESS NOTES
Telephone visit      Ms. Oreilly agrees to a telephone visit    She still follows at [facility] to consider IVF but has not been successful. As before she is aware that if she becomes pregnant that she has to discontinue her medications. She states she was at [facility] for annual exam and refill her OCP's. At time in office with elevated BP and that provider only gave her a two month supply of  Medication until further evaluation of elevated BP. Collette states at home with very well controlled BP in the 117/72 range. No new medications. She has completed the Pfizer COVID vaccination series. Plan to get 24 hour BP monitor and she is coming in for labs tomorrow to check cholesterol panel.       RENY Walker MD    Total time 5 minutes and 32 seconds

## 2021-08-10 ENCOUNTER — LAB (OUTPATIENT)
Dept: LAB | Facility: CLINIC | Age: 41
End: 2021-08-10
Attending: INTERNAL MEDICINE
Payer: COMMERCIAL

## 2021-08-10 ENCOUNTER — HOSPITAL ENCOUNTER (OUTPATIENT)
Dept: CARDIOLOGY | Facility: CLINIC | Age: 41
Discharge: HOME | End: 2021-08-10
Attending: INTERNAL MEDICINE | Admitting: INTERNAL MEDICINE
Payer: COMMERCIAL

## 2021-08-10 DIAGNOSIS — I10 BENIGN ESSENTIAL HYPERTENSION: ICD-10-CM

## 2021-08-10 DIAGNOSIS — E78.00 HIGH BLOOD CHOLESTEROL: ICD-10-CM

## 2021-08-10 LAB
ALBUMIN SERPL-MCNC: 3.8 G/DL (ref 3.4–5)
ALP SERPL-CCNC: 70 U/L (ref 40–150)
ALT SERPL W P-5'-P-CCNC: 15 U/L (ref 0–50)
ANION GAP SERPL CALCULATED.3IONS-SCNC: 6 MMOL/L (ref 3–14)
AST SERPL W P-5'-P-CCNC: 6 U/L (ref 0–45)
BASOPHILS # BLD AUTO: 0.1 10E3/UL (ref 0–0.2)
BASOPHILS NFR BLD AUTO: 1 %
BILIRUB SERPL-MCNC: 0.4 MG/DL (ref 0.2–1.3)
BUN SERPL-MCNC: 5 MG/DL (ref 7–30)
CALCIUM SERPL-MCNC: 8.5 MG/DL (ref 8.5–10.1)
CHLORIDE BLD-SCNC: 110 MMOL/L (ref 94–109)
CHOLEST SERPL-MCNC: 159 MG/DL
CO2 SERPL-SCNC: 23 MMOL/L (ref 20–32)
CREAT SERPL-MCNC: 0.73 MG/DL (ref 0.52–1.04)
EOSINOPHIL # BLD AUTO: 0.3 10E3/UL (ref 0–0.7)
EOSINOPHIL NFR BLD AUTO: 3 %
ERYTHROCYTE [DISTWIDTH] IN BLOOD BY AUTOMATED COUNT: 13.5 % (ref 10–15)
FASTING STATUS PATIENT QL REPORTED: YES
GFR SERPL CREATININE-BSD FRML MDRD: >90 ML/MIN/1.73M2
GLUCOSE BLD-MCNC: 95 MG/DL (ref 70–99)
HCT VFR BLD AUTO: 42.5 % (ref 35–47)
HDLC SERPL-MCNC: 44 MG/DL
HGB BLD-MCNC: 14.3 G/DL (ref 11.7–15.7)
IMM GRANULOCYTES # BLD: 0 10E3/UL
IMM GRANULOCYTES NFR BLD: 0 %
LDLC SERPL CALC-MCNC: 100 MG/DL
LYMPHOCYTES # BLD AUTO: 3.9 10E3/UL (ref 0.8–5.3)
LYMPHOCYTES NFR BLD AUTO: 33 %
MCH RBC QN AUTO: 29.7 PG (ref 26.5–33)
MCHC RBC AUTO-ENTMCNC: 33.6 G/DL (ref 31.5–36.5)
MCV RBC AUTO: 88 FL (ref 78–100)
MONOCYTES # BLD AUTO: 0.6 10E3/UL (ref 0–1.3)
MONOCYTES NFR BLD AUTO: 5 %
NEUTROPHILS # BLD AUTO: 6.8 10E3/UL (ref 1.6–8.3)
NEUTROPHILS NFR BLD AUTO: 58 %
NONHDLC SERPL-MCNC: 115 MG/DL
NRBC # BLD AUTO: 0 10E3/UL
NRBC BLD AUTO-RTO: 0 /100
PLATELET # BLD AUTO: 347 10E3/UL (ref 150–450)
POTASSIUM BLD-SCNC: 4 MMOL/L (ref 3.4–5.3)
PROT SERPL-MCNC: 6.8 G/DL (ref 6.8–8.8)
RBC # BLD AUTO: 4.81 10E6/UL (ref 3.8–5.2)
SODIUM SERPL-SCNC: 139 MMOL/L (ref 133–144)
TRIGL SERPL-MCNC: 75 MG/DL
WBC # BLD AUTO: 11.8 10E3/UL (ref 4–11)

## 2021-08-10 PROCEDURE — 36415 COLL VENOUS BLD VENIPUNCTURE: CPT | Performed by: PATHOLOGY

## 2021-08-10 PROCEDURE — 80053 COMPREHEN METABOLIC PANEL: CPT | Performed by: PATHOLOGY

## 2021-08-10 PROCEDURE — 93790 AMBL BP MNTR W/SW I&R: CPT | Performed by: INTERNAL MEDICINE

## 2021-08-10 PROCEDURE — 80061 LIPID PANEL: CPT | Performed by: PATHOLOGY

## 2021-08-10 PROCEDURE — 93788 AMBL BP MNTR W/SW A/R: CPT

## 2021-08-10 PROCEDURE — 85025 COMPLETE CBC W/AUTO DIFF WBC: CPT | Performed by: PATHOLOGY

## 2021-08-11 RX ORDER — PROPRANOLOL HYDROCHLORIDE 80 MG/1
80 CAPSULE, EXTENDED RELEASE ORAL DAILY
Qty: 90 CAPSULE | Refills: 0 | Status: SHIPPED | OUTPATIENT
Start: 2021-08-11 | End: 2022-03-02

## 2021-08-11 NOTE — TELEPHONE ENCOUNTER
Last Clinic Visit: 8/9/2021 [facility] [place]    *telephone visit- no BP taken but HTN plan discussed. Refill sent for 90 day supply only.

## 2021-09-15 ENCOUNTER — TELEPHONE (OUTPATIENT)
Dept: INTERNAL MEDICINE | Facility: CLINIC | Age: 41
End: 2021-09-15

## 2021-09-15 NOTE — TELEPHONE ENCOUNTER
Hardeep Walker   I am wondering if you were able to send over the ok to Dr. Mccarthy at [facility] for my blood pressure so that she will prescribe my birth control. Her fax is [phone]. Please just let me know.   Thanks  Majo VARGAS/yola faxed.  Sudha Boykin RN 2:42 PM on 9/15/2021.

## 2021-09-26 ENCOUNTER — HEALTH MAINTENANCE LETTER (OUTPATIENT)
Age: 41
End: 2021-09-26

## 2022-01-06 DIAGNOSIS — E78.5 HYPERLIPIDEMIA LDL GOAL <100: Primary | ICD-10-CM

## 2022-01-10 RX ORDER — SIMVASTATIN 20 MG
20 TABLET ORAL AT BEDTIME
Qty: 90 TABLET | Refills: 2 | Status: SHIPPED | OUTPATIENT
Start: 2022-01-10 | End: 2022-08-24

## 2022-02-26 DIAGNOSIS — G43.709 CHRONIC MIGRAINE WITHOUT AURA WITHOUT STATUS MIGRAINOSUS, NOT INTRACTABLE: Primary | ICD-10-CM

## 2022-03-02 RX ORDER — PROPRANOLOL HYDROCHLORIDE 80 MG/1
80 CAPSULE, EXTENDED RELEASE ORAL DAILY
Qty: 90 CAPSULE | Refills: 1 | Status: SHIPPED | OUTPATIENT
Start: 2022-03-02 | End: 2022-08-24

## 2022-05-08 ENCOUNTER — HEALTH MAINTENANCE LETTER (OUTPATIENT)
Age: 42
End: 2022-05-08

## 2022-05-31 DIAGNOSIS — G43.709 CHRONIC MIGRAINE WITHOUT AURA WITHOUT STATUS MIGRAINOSUS, NOT INTRACTABLE: ICD-10-CM

## 2022-06-01 PROCEDURE — 88305 TISSUE EXAM BY PATHOLOGIST: CPT | Mod: TC,ORL | Performed by: INTERNAL MEDICINE

## 2022-06-01 PROCEDURE — 88305 TISSUE EXAM BY PATHOLOGIST: CPT | Mod: 26 | Performed by: PATHOLOGY

## 2022-06-02 ENCOUNTER — LAB REQUISITION (OUTPATIENT)
Dept: LAB | Facility: CLINIC | Age: 42
End: 2022-06-02
Payer: COMMERCIAL

## 2022-06-02 DIAGNOSIS — K31.89 OTHER DISEASES OF STOMACH AND DUODENUM: ICD-10-CM

## 2022-06-02 DIAGNOSIS — R12 HEARTBURN: ICD-10-CM

## 2022-06-02 DIAGNOSIS — R11.2 NAUSEA WITH VOMITING, UNSPECIFIED: ICD-10-CM

## 2022-06-03 LAB
PATH REPORT.COMMENTS IMP SPEC: NORMAL
PATH REPORT.COMMENTS IMP SPEC: NORMAL
PATH REPORT.FINAL DX SPEC: NORMAL
PATH REPORT.GROSS SPEC: NORMAL
PATH REPORT.MICROSCOPIC SPEC OTHER STN: NORMAL
PATH REPORT.RELEVANT HX SPEC: NORMAL
PHOTO IMAGE: NORMAL

## 2022-06-03 RX ORDER — PROPRANOLOL HYDROCHLORIDE 80 MG/1
CAPSULE, EXTENDED RELEASE ORAL
Qty: 90 CAPSULE | Refills: 1 | OUTPATIENT
Start: 2022-06-03

## 2022-06-20 DIAGNOSIS — F33.40 RECURRENT MAJOR DEPRESSIVE DISORDER, IN REMISSION (H): ICD-10-CM

## 2022-07-14 ENCOUNTER — ANCILLARY PROCEDURE (OUTPATIENT)
Dept: MAMMOGRAPHY | Facility: CLINIC | Age: 42
End: 2022-07-14
Attending: INTERNAL MEDICINE
Payer: COMMERCIAL

## 2022-07-14 DIAGNOSIS — Z12.31 VISIT FOR SCREENING MAMMOGRAM: ICD-10-CM

## 2022-07-14 PROCEDURE — 77063 BREAST TOMOSYNTHESIS BI: CPT | Mod: TC | Performed by: RADIOLOGY

## 2022-07-14 PROCEDURE — 77067 SCR MAMMO BI INCL CAD: CPT | Mod: TC | Performed by: RADIOLOGY

## 2022-07-18 ENCOUNTER — HOSPITAL ENCOUNTER (OUTPATIENT)
Dept: NUCLEAR MEDICINE | Facility: CLINIC | Age: 42
Setting detail: NUCLEAR MEDICINE
Discharge: HOME | End: 2022-07-18
Attending: INTERNAL MEDICINE | Admitting: INTERNAL MEDICINE
Payer: COMMERCIAL

## 2022-07-18 DIAGNOSIS — K31.84 GASTROPARESIS: ICD-10-CM

## 2022-07-18 PROCEDURE — A9541 TC99M SULFUR COLLOID: HCPCS | Performed by: INTERNAL MEDICINE

## 2022-07-18 PROCEDURE — 343N000001 HC RX 343: Performed by: INTERNAL MEDICINE

## 2022-07-18 PROCEDURE — 78264 GASTRIC EMPTYING IMG STUDY: CPT

## 2022-07-18 RX ADMIN — Medication 1.1 MILLICURIE: at 07:30

## 2022-07-29 PROCEDURE — 88305 TISSUE EXAM BY PATHOLOGIST: CPT | Mod: TC,ORL | Performed by: INTERNAL MEDICINE

## 2022-07-29 PROCEDURE — 88305 TISSUE EXAM BY PATHOLOGIST: CPT | Mod: 26 | Performed by: PATHOLOGY

## 2022-08-01 ENCOUNTER — LAB REQUISITION (OUTPATIENT)
Dept: LAB | Facility: CLINIC | Age: 42
End: 2022-08-01
Payer: COMMERCIAL

## 2022-08-01 DIAGNOSIS — R11.2 NAUSEA WITH VOMITING, UNSPECIFIED: ICD-10-CM

## 2022-08-01 DIAGNOSIS — R12 HEARTBURN: ICD-10-CM

## 2022-08-01 DIAGNOSIS — K21.00 GASTRO-ESOPHAGEAL REFLUX DISEASE WITH ESOPHAGITIS, WITHOUT BLEEDING: ICD-10-CM

## 2022-08-01 DIAGNOSIS — K30 FUNCTIONAL DYSPEPSIA: ICD-10-CM

## 2022-08-01 DIAGNOSIS — K31.89 OTHER DISEASES OF STOMACH AND DUODENUM: ICD-10-CM

## 2022-08-24 ENCOUNTER — LAB (OUTPATIENT)
Dept: LAB | Facility: CLINIC | Age: 42
End: 2022-08-24
Payer: COMMERCIAL

## 2022-08-24 ENCOUNTER — OFFICE VISIT (OUTPATIENT)
Dept: INTERNAL MEDICINE | Facility: CLINIC | Age: 42
End: 2022-08-24
Payer: COMMERCIAL

## 2022-08-24 VITALS
WEIGHT: 213 LBS | HEIGHT: 66 IN | DIASTOLIC BLOOD PRESSURE: 97 MMHG | SYSTOLIC BLOOD PRESSURE: 145 MMHG | BODY MASS INDEX: 34.23 KG/M2 | HEART RATE: 98 BPM | OXYGEN SATURATION: 95 %

## 2022-08-24 DIAGNOSIS — F33.40 RECURRENT MAJOR DEPRESSIVE DISORDER, IN REMISSION (H): ICD-10-CM

## 2022-08-24 DIAGNOSIS — E78.00 HIGH BLOOD CHOLESTEROL: ICD-10-CM

## 2022-08-24 DIAGNOSIS — G43.709 CHRONIC MIGRAINE WITHOUT AURA WITHOUT STATUS MIGRAINOSUS, NOT INTRACTABLE: ICD-10-CM

## 2022-08-24 DIAGNOSIS — E78.00 HIGH BLOOD CHOLESTEROL: Primary | ICD-10-CM

## 2022-08-24 DIAGNOSIS — E78.5 HYPERLIPIDEMIA LDL GOAL <100: ICD-10-CM

## 2022-08-24 DIAGNOSIS — Z23 NEED FOR VACCINATION: ICD-10-CM

## 2022-08-24 LAB
ALBUMIN SERPL-MCNC: 3.6 G/DL (ref 3.4–5)
ALP SERPL-CCNC: 93 U/L (ref 40–150)
ALT SERPL W P-5'-P-CCNC: 18 U/L (ref 0–50)
AMYLASE SERPL-CCNC: 42 U/L (ref 30–110)
ANION GAP SERPL CALCULATED.3IONS-SCNC: 6 MMOL/L (ref 3–14)
AST SERPL W P-5'-P-CCNC: 11 U/L (ref 0–45)
BASOPHILS # BLD AUTO: 0 10E3/UL (ref 0–0.2)
BASOPHILS NFR BLD AUTO: 0 %
BILIRUB SERPL-MCNC: 0.2 MG/DL (ref 0.2–1.3)
BUN SERPL-MCNC: 8 MG/DL (ref 7–30)
CALCIUM SERPL-MCNC: 8.6 MG/DL (ref 8.5–10.1)
CHLORIDE BLD-SCNC: 113 MMOL/L (ref 94–109)
CHOLEST SERPL-MCNC: 183 MG/DL
CO2 SERPL-SCNC: 22 MMOL/L (ref 20–32)
CREAT SERPL-MCNC: 0.68 MG/DL (ref 0.52–1.04)
EOSINOPHIL # BLD AUTO: 0.2 10E3/UL (ref 0–0.7)
EOSINOPHIL NFR BLD AUTO: 1 %
ERYTHROCYTE [DISTWIDTH] IN BLOOD BY AUTOMATED COUNT: 13.7 % (ref 10–15)
FASTING STATUS PATIENT QL REPORTED: NO
GFR SERPL CREATININE-BSD FRML MDRD: >90 ML/MIN/1.73M2
GLUCOSE BLD-MCNC: 100 MG/DL (ref 70–99)
HCT VFR BLD AUTO: 43 % (ref 35–47)
HDLC SERPL-MCNC: 46 MG/DL
HGB BLD-MCNC: 14.4 G/DL (ref 11.7–15.7)
IMM GRANULOCYTES # BLD: 0.1 10E3/UL
IMM GRANULOCYTES NFR BLD: 0 %
LDLC SERPL CALC-MCNC: 106 MG/DL
LIPASE SERPL-CCNC: 206 U/L (ref 73–393)
LYMPHOCYTES # BLD AUTO: 3.7 10E3/UL (ref 0.8–5.3)
LYMPHOCYTES NFR BLD AUTO: 28 %
MCH RBC QN AUTO: 29.3 PG (ref 26.5–33)
MCHC RBC AUTO-ENTMCNC: 33.5 G/DL (ref 31.5–36.5)
MCV RBC AUTO: 87 FL (ref 78–100)
MONOCYTES # BLD AUTO: 0.6 10E3/UL (ref 0–1.3)
MONOCYTES NFR BLD AUTO: 5 %
NEUTROPHILS # BLD AUTO: 8.4 10E3/UL (ref 1.6–8.3)
NEUTROPHILS NFR BLD AUTO: 66 %
NONHDLC SERPL-MCNC: 137 MG/DL
NRBC # BLD AUTO: 0 10E3/UL
NRBC BLD AUTO-RTO: 0 /100
PLATELET # BLD AUTO: 396 10E3/UL (ref 150–450)
POTASSIUM BLD-SCNC: 4.1 MMOL/L (ref 3.4–5.3)
PROT SERPL-MCNC: 7.1 G/DL (ref 6.8–8.8)
RBC # BLD AUTO: 4.92 10E6/UL (ref 3.8–5.2)
SODIUM SERPL-SCNC: 141 MMOL/L (ref 133–144)
TRIGL SERPL-MCNC: 157 MG/DL
WBC # BLD AUTO: 12.9 10E3/UL (ref 4–11)

## 2022-08-24 PROCEDURE — 99000 SPECIMEN HANDLING OFFICE-LAB: CPT | Performed by: PATHOLOGY

## 2022-08-24 PROCEDURE — 36415 COLL VENOUS BLD VENIPUNCTURE: CPT | Performed by: PATHOLOGY

## 2022-08-24 PROCEDURE — 80061 LIPID PANEL: CPT | Performed by: PATHOLOGY

## 2022-08-24 PROCEDURE — 85025 COMPLETE CBC W/AUTO DIFF WBC: CPT | Performed by: PATHOLOGY

## 2022-08-24 PROCEDURE — 0064A COVID-19,PF,MODERNA (18+ YRS BOOSTER .25ML): CPT | Performed by: INTERNAL MEDICINE

## 2022-08-24 PROCEDURE — 80053 COMPREHEN METABOLIC PANEL: CPT | Performed by: PATHOLOGY

## 2022-08-24 PROCEDURE — 83690 ASSAY OF LIPASE: CPT | Performed by: PATHOLOGY

## 2022-08-24 PROCEDURE — 82150 ASSAY OF AMYLASE: CPT | Performed by: PATHOLOGY

## 2022-08-24 PROCEDURE — 99215 OFFICE O/P EST HI 40 MIN: CPT | Mod: 25 | Performed by: INTERNAL MEDICINE

## 2022-08-24 PROCEDURE — 91306 COVID-19,PF,MODERNA (18+ YRS BOOSTER .25ML): CPT | Performed by: INTERNAL MEDICINE

## 2022-08-24 RX ORDER — METOCLOPRAMIDE 10 MG/1
10 TABLET ORAL DAILY
COMMUNITY

## 2022-08-24 RX ORDER — SIMVASTATIN 20 MG
20 TABLET ORAL AT BEDTIME
Qty: 90 TABLET | Refills: 3 | Status: SHIPPED | OUTPATIENT
Start: 2022-08-24 | End: 2023-07-17

## 2022-08-24 RX ORDER — PROPRANOLOL HYDROCHLORIDE 80 MG/1
80 CAPSULE, EXTENDED RELEASE ORAL DAILY
Qty: 90 CAPSULE | Refills: 3 | Status: SHIPPED | OUTPATIENT
Start: 2022-08-24 | End: 2023-07-17

## 2022-08-24 RX ORDER — PANTOPRAZOLE SODIUM 40 MG/1
40 TABLET, DELAYED RELEASE ORAL DAILY
COMMUNITY

## 2022-08-24 NOTE — PROGRESS NOTES
HPI:    Last telephone visit with me was 8/9/2021. She has been seeing outside GI due to abdominal reflux complaints. She states more-or-less abruptly 2/2022 mid abdominal heart burn with anorexia. She has lost about 30 lbs. She had two EGD studies and a normal gastric emptying study. She feels better taking Reglan and higher dose PPI. She has not had labs, no CT nor U/S abdominal imaging. No new medications. No other HEENT, cardiopulmonary, abdominal, , GYN, neurological, systemic, psychiatric, lymphatic, endocrine, vascular complaints.     Past Medical History:   Diagnosis Date     Alopecia      Combined hyperlipidemia      Depression      Migraines      PCOS (polycystic ovarian syndrome)      PTSD (post-traumatic stress disorder)      Past Surgical History:   Procedure Laterality Date     EXTRACTION(S) DENTAL       GYN SURGERY  [facility] 2007    had ovarian cyst removed, had second surgery approx 2012     LASIK BILATERAL       left salpingo       ORTHOPEDIC SURGERY  approx 2009    knee surgery     SALPINGO OOPHORECTOMY,R/L/MARIBEL      left side     PE:    Vitals noted, gen, nad, cooperative, alert, neck supple nl rom, lungs with good air movement, RRR, S1, S2, no MRG, abdomen with positive bowel sounds, no rebound, no tenderness. Grossly normal neurological exam.       A/P:    1. Immunizations; Tdap 5/3/2016. Pfizer COVID vaccine x 2. Moderna COVID booster today 8/24/2022.   2. Increased lipids on Simvastatin; lipids 8/10/2021 HDL 44, TG's 75 and .   3. Migraines; PRN Axert  4. Depression on Fluoxetine  5. EGD 6/1/2022 and 7/29/2022 Pathology noted in EMR; no reports. Gastric emptying study done 7/18/2022; normal. On Protonix and Reglan. Has a GI follow up tomorrow.   6. Mammogram 7/14/2022  7. Fertility issues; she still would like to conceive and has been following at the [facility]. She is aware she can't be pregnant on many of her medications.   8. She has a GI follow up appt. Tomorrow. Ordered labs today.  Discussed about imaging as well. I will see her back in one month.

## 2022-08-24 NOTE — NURSING NOTE
"Majo Oreilly is a 41 year old female patient that presents today in clinic for the following:    Chief Complaint   Patient presents with     Recheck Medication     Per pt.     The patient's allergies and medications were reviewed as noted. A set of vitals were recorded as noted without incident: BP (!) 145/97 (BP Location: Right arm, Patient Position: Sitting, Cuff Size: Adult Regular)   Pulse 98   Ht 1.676 m (5' 6\")   Wt 96.6 kg (213 lb)   SpO2 95%   BMI 34.38 kg/m  . The patient does not have any other questions for the provider.    Gwen Garcia, EMT at 2:25 PM on 8/24/2022    "

## 2022-08-28 ENCOUNTER — HEALTH MAINTENANCE LETTER (OUTPATIENT)
Age: 42
End: 2022-08-28

## 2022-09-24 DIAGNOSIS — F33.40 RECURRENT MAJOR DEPRESSIVE DISORDER, IN REMISSION (H): ICD-10-CM

## 2022-09-27 NOTE — TELEPHONE ENCOUNTER
FLUOXETINE 20MG CAPSULES    Resent order to pharmacy for Pt care  Order from 8/24/2022  Resent 90 Caps, 3 refills sent on 9/27/2022    Dianna Schaefer RN  Central Triage Red Flags/Med Refills    Warnings Override History for FLUoxetine (PROZAC) 20 MG capsule [[id number]]    Overridden by Cal Walker MD on Aug 24, 2022 5:37 PM   Drug-Drug   1. IBUPROFEN / SELECTIVE SEROTONIN REUPTAKE INHIBITORS [Level: Major]   Other Orders: IBUPROFEN PO      2. IBUPROFEN / SELECTIVE SEROTONIN REUPTAKE INHIBITORS [Level: Major]   Other Orders: IBUPROFEN PO

## 2022-10-11 DIAGNOSIS — E78.5 HYPERLIPIDEMIA LDL GOAL <100: ICD-10-CM

## 2022-10-14 RX ORDER — SIMVASTATIN 20 MG
TABLET ORAL
Qty: 90 TABLET | Refills: 3 | OUTPATIENT
Start: 2022-10-14

## 2023-01-14 ENCOUNTER — HEALTH MAINTENANCE LETTER (OUTPATIENT)
Age: 43
End: 2023-01-14

## 2023-07-16 NOTE — PROGRESS NOTES
HPI:    Ms. Oreilly comes in for follow up today. She is still smoking. She remains on  Fluoxetine for depression. She is on Simvastatin for increased lipids. She takes Axert for migraines and feels this is quite effective. No additional HEENT, cardiopulmonary, abdominal, , GYN, neurological, systemic, psychiatric, lymphatic, endocrine, vascular complaints.     Past Medical History:   Diagnosis Date     Alopecia      Combined hyperlipidemia      Depression      Migraines      PCOS (polycystic ovarian syndrome)      PTSD (post-traumatic stress disorder)      Past Surgical History:   Procedure Laterality Date     EXTRACTION(S) DENTAL       GYN SURGERY  [facility] 2007    had ovarian cyst removed, had second surgery approx 2012     LASIK BILATERAL       left salpingo       ORTHOPEDIC SURGERY  approx 2009    knee surgery     SALPINGO OOPHORECTOMY,R/L/MARIBEL      left side     PE:    Vitals noted, gen, nad, cooperative, alert, neck supple nl rom, lungs with good air movement, RRR, S1, S2, no MRG, abdomen, no acute finding. Grossly normal neurological exam.     A/P:    1. Immunizations; Tdap 5/3/2016. Pfizer COVID vaccine x 2. Moderna COVID booster  8/24/2022.   2. Increased lipids on Simvastatin; lipids 8/24/2022  HDL 46, TG's 157 and . Ordered future   3. Migraines; PRN Axert; this works for her and refilled today.   4. Depression on Fluoxetine  5. EGD 6/1/2022 and 7/29/2022 Pathology noted in EMR; no reports. Gastric emptying study done 7/18/2022; normal. On Protonix (refilled today)  6. Mammogram 7/14/2022. She states she will schedule her mammogram.   7. Fertility issues; she still would like to conceive and has been following at the [facility]. She is aware she can't be pregnant on many of her medications. She may return to the [facility] after discontinuing smoking.   8. Placed [facility] referral to Cal Fitch, Smoking cessation.   9. She states she is due to get colonoscopy at outside provider.   10. She does not feel  she needs to see Dermatology for a skin cancer screening exam.          30 minutes spent on the date of the encounter doing chart review, history and exam, documentation and further activities as noted above exclusive of procedures and other billable interpretations

## 2023-07-17 ENCOUNTER — OFFICE VISIT (OUTPATIENT)
Dept: INTERNAL MEDICINE | Facility: CLINIC | Age: 43
End: 2023-07-17
Payer: COMMERCIAL

## 2023-07-17 VITALS
SYSTOLIC BLOOD PRESSURE: 137 MMHG | HEIGHT: 66 IN | OXYGEN SATURATION: 96 % | HEART RATE: 61 BPM | WEIGHT: 227.3 LBS | DIASTOLIC BLOOD PRESSURE: 89 MMHG | BODY MASS INDEX: 36.53 KG/M2

## 2023-07-17 DIAGNOSIS — F33.40 RECURRENT MAJOR DEPRESSIVE DISORDER, IN REMISSION (H): ICD-10-CM

## 2023-07-17 DIAGNOSIS — G43.009 MIGRAINE WITHOUT AURA AND WITHOUT STATUS MIGRAINOSUS, NOT INTRACTABLE: ICD-10-CM

## 2023-07-17 DIAGNOSIS — E78.00 HIGH BLOOD CHOLESTEROL: Primary | ICD-10-CM

## 2023-07-17 DIAGNOSIS — G43.709 CHRONIC MIGRAINE WITHOUT AURA WITHOUT STATUS MIGRAINOSUS, NOT INTRACTABLE: ICD-10-CM

## 2023-07-17 DIAGNOSIS — E78.5 HYPERLIPIDEMIA LDL GOAL <100: ICD-10-CM

## 2023-07-17 DIAGNOSIS — Z71.6 ENCOUNTER FOR SMOKING CESSATION COUNSELING: ICD-10-CM

## 2023-07-17 PROCEDURE — 99214 OFFICE O/P EST MOD 30 MIN: CPT | Performed by: INTERNAL MEDICINE

## 2023-07-17 RX ORDER — ALMOTRIPTAN 12.5 MG/1
12.5 TABLET, FILM COATED ORAL DAILY PRN
Qty: 36 TABLET | Refills: 3 | Status: SHIPPED | OUTPATIENT
Start: 2023-07-17

## 2023-07-17 RX ORDER — SIMVASTATIN 20 MG
20 TABLET ORAL AT BEDTIME
Qty: 90 TABLET | Refills: 3 | Status: SHIPPED | OUTPATIENT
Start: 2023-07-17

## 2023-07-17 RX ORDER — PROPRANOLOL HYDROCHLORIDE 80 MG/1
80 CAPSULE, EXTENDED RELEASE ORAL DAILY
Qty: 90 CAPSULE | Refills: 3 | Status: SHIPPED | OUTPATIENT
Start: 2023-07-17 | End: 2024-09-04

## 2023-07-17 NOTE — NURSING NOTE
Majo Oreilly is a 42 year old female patient that presents today in clinic for the following:    Chief Complaint   Patient presents with     RECHECK     Medication review     The patient's allergies and medications were reviewed as noted. A set of vitals were recorded as noted without incident. The patient does not have any other questions for the provider.    César Holloway, EMT at 5:51 PM on 7/17/2023

## 2023-07-18 ENCOUNTER — TELEPHONE (OUTPATIENT)
Dept: INTERNAL MEDICINE | Facility: CLINIC | Age: 43
End: 2023-07-18
Payer: COMMERCIAL

## 2023-07-24 ENCOUNTER — VIRTUAL VISIT (OUTPATIENT)
Dept: PHARMACY | Facility: CLINIC | Age: 43
End: 2023-07-24
Attending: INTERNAL MEDICINE
Payer: COMMERCIAL

## 2023-07-24 DIAGNOSIS — F32.A DEPRESSION, UNSPECIFIED DEPRESSION TYPE: ICD-10-CM

## 2023-07-24 DIAGNOSIS — Z30.018 HORMONAL CONTRACEPTIVE: ICD-10-CM

## 2023-07-24 DIAGNOSIS — K31.84 GASTROPARESIS: ICD-10-CM

## 2023-07-24 DIAGNOSIS — G43.909 MIGRAINE WITHOUT STATUS MIGRAINOSUS, NOT INTRACTABLE, UNSPECIFIED MIGRAINE TYPE: ICD-10-CM

## 2023-07-24 DIAGNOSIS — Z72.0 TOBACCO ABUSE: Primary | ICD-10-CM

## 2023-07-24 DIAGNOSIS — Z78.9 TAKES DIETARY SUPPLEMENTS: ICD-10-CM

## 2023-07-24 DIAGNOSIS — E78.2 MIXED HYPERLIPIDEMIA: ICD-10-CM

## 2023-07-24 DIAGNOSIS — K21.9 GASTROESOPHAGEAL REFLUX DISEASE WITHOUT ESOPHAGITIS: ICD-10-CM

## 2023-07-24 NOTE — PROGRESS NOTES
Medication Therapy Management (MTM) Encounter    ASSESSMENT:                            Medication Adherence/Access: No issues identified    Smoking Cessation:  Patient would benefit from smoking cessation therapy. Will start with chantix and add on nicotine replacement if neede.d     Migraines:  Stable. Patient could switch to a cheaper triptan like naritriptan, rizatriptan, or zolmitriptan if desired but patient would like to stick with what is working for now.     Depression:  Stable.     Gastroparesis:  Stable.     Hormonal Therapy:  Stable.     GERD:   Stable.     Hyperlipidemia:   Due for lipid panel which has already been ordered.    Supplements:  Stable.     PLAN:                            Start Chantix 0.5 mg daily for 3 days, the 0.5 mg twice a day for 4 days, then 1 mg twice a day thereafter. Goal is to quit on day 8 of therapy. If unsuccessful goal will be to decrease cigarette use by 50% by week 4 and then another 50% decrease by week 8, and stopping by week 12.     Follow-up: Return in about 5 weeks (around 8/28/2023) for Follow up, with me, using a video visit.    SUBJECTIVE/OBJECTIVE:                          Majo Oreilly is a 42 year old female called for an initial visit. She was referred to me from Cal Walker      Reason for visit: smoking cessation.    Allergies/ADRs: Reviewed in chart  Past Medical History: Reviewed in chart  Tobacco: She reports that she has been smoking cigarettes. She has a 15.00 pack-year smoking history. She has never used smokeless tobacco.Nicotine/Tobacco Cessation Plan:   Medication Therapy Management  (Referral to MTM)    Alcohol: a couple times per month      Medication Adherence/Access: no issues reported      Smoking Cessation:  No Current Therapy  How much does patient smoke:  1.5-2 packs daily  Triggers for smoking include:  stress, and the habit  Tried quitting in the past: Yes.  How many times:  3-4.  For how long: never successful.  What worked/didn t  work in the past: changing habits. Has been on pharmacotherapy in the past but doesn't remember.  Why does patient want to quit (motivators for quitting): wanting to get pregnant  Is patient currently pregnant: No  History of seizures/bipolar disease: No    Migraines:  Almotriptan 12.5 mg daily as needed for migraines - reports this is expensive but wants to stick with this.  Ibuprofen as needed   Priopranolol 80 mg daily and this has been helpful.     Had hives from sumatriptin. Almotrioptan has worked well for her.     Depression:  Fluoxetine 20 mg daily    Works well and No concerns or questions at this time.     Gastroparesis:  Metoclopramide 10 mg takes once daily in the evening. May be going off of this.     Hormonal Therapy:  Sprintec once daily.     No concerns or questions at this time.     GERD:   Pantoprazole 40 mg once daily   Patient reports no current symptoms.  Patient feels that current regimen is effective. May try to go off in the future.     Hyperlipidemia:   simvastatin 20mg daily  Patient reports no significant myalgias or other side effects.  Was unable to tolerate atorvastatin. Does have a lipid panel scheduled.   The 10-year ASCVD risk score (Kaley WASHINGTON, et al., 2019) is: 4.5%    Values used to calculate the score:      Age: 42 years      Sex: Female      Is Non- : No      Diabetic: No      Tobacco smoker: Yes      Systolic Blood Pressure: 137 mmHg      Is BP treated: Yes      HDL Cholesterol: 46 mg/dL      Total Cholesterol: 183 mg/dL  Recent Labs   Lab Test 08/24/22  1537 08/10/21  0945   CHOL 183 159   HDL 46* 44*   * 100   TRIG 157* 75     Supplements:  Echinacea 500 mg daily    No concerns or questions at this time.      Today's Vitals: There were no vitals taken for this visit.  ----------------      I spent 18 minutes with this patient today. All changes were made via collaborative practice agreement with Cal Walker MD. A copy of the visit note was  provided to the patient's provider(s).    A summary of these recommendations was sent via [email or web address].    Cal Fitch, Pharm. D., BCACP  Medication Therapy Management Pharmacist      Telemedicine Visit Details  Type of service:  Video Conference via [employer]  Start Time:  1234 p  End Time:  1252 p     Medication Therapy Recommendations  Tobacco abuse    Rationale: Untreated condition - Needs additional medication therapy - Indication   Recommendation: Start Medication - CHANTIX PO   Status: Accepted per CPA

## 2023-07-24 NOTE — PATIENT INSTRUCTIONS
"Recommendations from today's MTM visit:                                                    MTM (medication therapy management) is a service provided by a clinical pharmacist designed to help you get the most of out of your medicines.   Today we reviewed what your medicines are for, how to know if they are working, that your medicines are safe and how to make your medicine regimen as easy as possible.      Start Chantix 0.5 mg daily for 3 days, the 0.5 mg twice a day for 4 days, then 1 mg twice a day thereafter. Goal is to quit on day 8 of therapy. If unsuccessful goal will be to decrease cigarette use by 505% by week 4 and then another 50% decrease by week 8, and stopping by week 12.     Follow-up: Return in about 5 weeks (around 8/28/2023) for Follow up, with me, using a video visit.    It was great speaking with you today.  I value your experience and would be very thankful for your time in providing feedback in our clinic survey. In the next few days, you may receive an email or text message from [employer] with a link to a survey related to your  clinical pharmacist.\"     To schedule another MTM appointment, please call the clinic directly or you may call the MTM scheduling line at [phone] or toll-free at [phone].     My Clinical Pharmacist's contact information:                                                      Please feel free to contact me with any questions or concerns you have.      Cal Fitch, Pharm. D., [facility]  Medication Therapy Management Pharmacist     "

## 2023-08-28 ENCOUNTER — VIRTUAL VISIT (OUTPATIENT)
Dept: PHARMACY | Facility: CLINIC | Age: 43
End: 2023-08-28
Payer: COMMERCIAL

## 2023-08-28 DIAGNOSIS — G43.909 MIGRAINE WITHOUT STATUS MIGRAINOSUS, NOT INTRACTABLE, UNSPECIFIED MIGRAINE TYPE: ICD-10-CM

## 2023-08-28 DIAGNOSIS — Z72.0 TOBACCO ABUSE: Primary | ICD-10-CM

## 2023-08-28 DIAGNOSIS — E78.2 MIXED HYPERLIPIDEMIA: ICD-10-CM

## 2023-08-28 PROCEDURE — 99207 PR NO CHARGE LOS: CPT | Mod: VID | Performed by: PHARMACIST

## 2023-08-28 RX ORDER — NICOTINE 21 MG/24HR
1 PATCH, TRANSDERMAL 24 HOURS TRANSDERMAL EVERY 24 HOURS
Qty: 30 PATCH | Refills: 1 | Status: SHIPPED | OUTPATIENT
Start: 2023-08-28

## 2023-08-28 RX ORDER — VARENICLINE TARTRATE 1 MG/1
1 TABLET, FILM COATED ORAL 2 TIMES DAILY
Qty: 60 TABLET | Refills: 3 | Status: SHIPPED | OUTPATIENT
Start: 2023-08-28 | End: 2023-12-28

## 2023-08-28 NOTE — PATIENT INSTRUCTIONS
"Recommendations from today's MTM visit:                                                    MTM (medication therapy management) is a service provided by a clinical pharmacist designed to help you get the most of out of your medicines.   Today we reviewed what your medicines are for, how to know if they are working, that your medicines are safe and how to make your medicine regimen as easy as possible.      Start nicotine patch 21 mg once daily. Reduce or quit smoking when starting the patch.    Follow-up: Return in about 4 weeks (around 9/25/2023) for Follow up, with me, using a video visit.    It was great speaking with you today.  I value your experience and would be very thankful for your time in providing feedback in our clinic survey. In the next few days, you may receive an email or text message from [employer] with a link to a survey related to your  clinical pharmacist.\"     To schedule another MTM appointment, please call the clinic directly or you may call the MTM scheduling line at [phone] or toll-free at [phone].     My Clinical Pharmacist's contact information:                                                      Please feel free to contact me with any questions or concerns you have.      Cal Fitch, Pharm. D., BCACP  Medication Therapy Management Pharmacist     "

## 2023-08-28 NOTE — PROGRESS NOTES
Medication Therapy Management (MTM) Encounter    ASSESSMENT:                            Medication Adherence/Access: No issues identified    Smoking Cessation:  Patient needs updated prescription for Chantix at 1 mg. Discussed the risks of smoking while still using the patch. Since she is still smoking more than 10 cigarettes per day she should start with the 21 mg patch.      Migraines:  Stable.     Hyperlipidemia: Reminded patient to get lipid panel drawn. Discussed with patient that as she tries to get pregnant she would need to have her medications reviewed for appropriateness during pregnancy.    PLAN:                            Start nicotine patch 21 mg once daily. Reduce or quit smoking when starting the patch.    Follow-up: Return in about 4 weeks (around 9/25/2023) for Follow up, with me, using a video visit.    SUBJECTIVE/OBJECTIVE:                          Majo Oreilly is a 42 year old female contacted via secure video for a follow-up visit from 7/24.       Reason for visit: Smoking follow-up.    Allergies/ADRs: Reviewed in chart  Past Medical History: Reviewed in chart  Tobacco: She reports that she has been smoking cigarettes. She has a 15.00 pack-year smoking history. She has never used smokeless tobacco.Nicotine/Tobacco Cessation Plan:   Pharmacotherapies : varenicline (Chantix)    Alcohol: a couple times per month    Medication Adherence/Access: no issues reported    Smoking Cessation:  Chantix 1 mg twice a day - gets nauseas if she doesn't eat it  Reports that she has been able to cut back on smoking by 50% around a pack per day. Reports she is a little nervous about decreasing again by 50%. Reports she would like to try adding on the nicotine patch.      Migraines:  Almotriptan 12.5 mg daily as needed for migraines - reports this is expensive but wants to stick with this.  Ibuprofen as needed   Propranolol 80 mg daily and this has been helpful.      Had hives from sumatriptin. Almotrioptan has  worked well for her. Things are still going well. Only had one migraine in the last month.     Hyperlipidemia:   simvastatin 20mg daily - reports she forgot to schedule an updated lipid panel.   Patient reports no significant myalgias or other side effects.  The 10-year ASCVD risk score (Kaley WASHINGTON, et al., 2019) is: 4.5%    Values used to calculate the score:      Age: 42 years      Sex: Female      Is Non- : No      Diabetic: No      Tobacco smoker: Yes      Systolic Blood Pressure: 137 mmHg      Is BP treated: Yes      HDL Cholesterol: 46 mg/dL      Total Cholesterol: 183 mg/dL  Recent Labs   Lab Test 08/24/22  1537 08/10/21  0945   CHOL 183 159   HDL 46* 44*   * 100   TRIG 157* 75         Today's Vitals: There were no vitals taken for this visit.  ----------------      I spent 8 minutes with this patient today. All changes were made via collaborative practice agreement with Cal Walker MD. A copy of the visit note was provided to the patient's provider(s).    A summary of these recommendations was sent via [employer].    Cal Fitch, Pharm. D., BCACP  Medication Therapy Management Pharmacist      Telemedicine Visit Details  Type of service:  Video Conference via [employer]  Start Time:  129 pm  End Time:  137 pm     Medication Therapy Recommendations  Tobacco abuse    Current Medication: varenicline (CHANTIX) 1 MG tablet   Rationale: Synergistic therapy - Needs additional medication therapy - Indication   Recommendation: Start Medication   Status: Accepted per CPA

## 2023-09-24 ENCOUNTER — HEALTH MAINTENANCE LETTER (OUTPATIENT)
Age: 43
End: 2023-09-24

## 2023-09-25 ENCOUNTER — VIRTUAL VISIT (OUTPATIENT)
Dept: PHARMACY | Facility: CLINIC | Age: 43
End: 2023-09-25
Payer: COMMERCIAL

## 2023-09-25 DIAGNOSIS — E78.2 MIXED HYPERLIPIDEMIA: ICD-10-CM

## 2023-09-25 DIAGNOSIS — Z72.0 TOBACCO ABUSE: Primary | ICD-10-CM

## 2023-09-25 PROCEDURE — 99207 PR NO CHARGE LOS: CPT | Mod: VID | Performed by: PHARMACIST

## 2023-09-25 NOTE — PROGRESS NOTES
Medication Therapy Management (MTM) Encounter    ASSESSMENT:                            Medication Adherence/Access: No issues identified    Smoking Cessation:   Patient has made good progress even though she was short of our codeveloped goal of a half a pack per day. Reinforced for Majo that I have had many patients use the patch while still smoking without incident. If she experienced's any side effects she can remove the patch right away and we can discuss a lower dose.     Hyperlipidemia:   Reminded patient to schedule lab visit and gave her number to the [facility].      PLAN:                            Try utilizing the nicotine patch daily and remove if experiencing adverse effects.  Goal is to be at or below a half pack per day by our next visit.  Call [phone] to schedule a lab appointment at the [facility]    Follow-up: Return in about 4 weeks (around 10/23/2023) for Follow up, with me.    SUBJECTIVE/OBJECTIVE:                          Majo Oreilly is a 43 year old female contacted via secure video for a follow-up visit from 8/28.       Reason for visit: Smoking cessation.    Allergies/ADRs: Reviewed in chart  Past Medical History: Reviewed in chart  Tobacco: She reports that she has been smoking cigarettes. She has a 15.00 pack-year smoking history. She has never used smokeless tobacco.Nicotine/Tobacco Cessation Plan:   Pharmacotherapies : varenicline (Chantix)    Alcohol: a couple times per month    Medication Adherence/Access: no issues reported    Smoking Cessation:  Chantix 1 mg twice a day - gets nauseas if she doesn't eat it  Has had good days and bad days. This goal hs been more difficult than previously  Reports that she has been able to get down to a little more than a half a pack. Going forward she thinking about shooting for a half pack.  She did not try the patch as she is afraid of side effects.         Hyperlipidemia:   simvastatin 20mg daily - reports she forgot  to schedule an updated lipid panel.   Patient reports no significant myalgias or other side effects.  The 10-year ASCVD risk score (Kaley WASHINGTON, et al., 2019) is: 4.5%    Values used to calculate the score:      Age: 42 years      Sex: Female      Is Non- : No      Diabetic: No      Tobacco smoker: Yes      Systolic Blood Pressure: 137 mmHg      Is BP treated: Yes      HDL Cholesterol: 46 mg/dL      Total Cholesterol: 183 mg/dL       Recent Labs   Lab Test 08/24/22  1537 08/10/21  0945   CHOL 183 159   HDL 46* 44*   * 100   TRIG 157* 75            Today's Vitals: There were no vitals taken for this visit.  ----------------      I spent 7 minutes with this patient today. All changes were made via collaborative practice agreement with Cal Walker MD. A copy of the visit note was provided to the patient's provider(s).    A summary of these recommendations was sent via [employer].    Cal Fitch, Pharm. D., BCACP  Medication Therapy Management Pharmacist      Telemedicine Visit Details  Type of service:  Video Conference via [employer]  Start Time:  130 pm  End Time: 1:37 PM       Medication Therapy Recommendations  Tobacco abuse    Current Medication: nicotine (NICODERM CQ) 21 MG/24HR 24 hr patch   Rationale: Patient prefers not to take - Adherence - Adherence   Recommendation: Provide Education   Status: Patient Agreed - Adherence/Education

## 2023-09-27 NOTE — PATIENT INSTRUCTIONS
"Recommendations from today's MTM visit:                                                         Try utilizing the nicotine patch daily and remove if experiencing adverse effects.  Goal is to be at or below a half pack per day by our next visit.  Call [phone] to schedule a lab appointment at the [facility]    Follow-up: Return in about 4 weeks (around 10/23/2023) for Follow up, with me.    It was great speaking with you today.  I value your experience and would be very thankful for your time in providing feedback in our clinic survey. In the next few days, you may receive an email or text message from [employer] with a link to a survey related to your  clinical pharmacist.\"     To schedule another MTM appointment, please call the clinic directly or you may call the MTM scheduling line at [phone] or toll-free at [phone].     My Clinical Pharmacist's contact information:                                                      Please feel free to contact me with any questions or concerns you have.      Cal Fitch, Pharm. D., BCACP  Medication Therapy Management Pharmacist     "

## 2023-10-06 ENCOUNTER — LAB (OUTPATIENT)
Dept: LAB | Facility: CLINIC | Age: 43
End: 2023-10-06
Payer: COMMERCIAL

## 2023-10-06 DIAGNOSIS — Z71.6 ENCOUNTER FOR SMOKING CESSATION COUNSELING: ICD-10-CM

## 2023-10-06 DIAGNOSIS — G43.009 MIGRAINE WITHOUT AURA AND WITHOUT STATUS MIGRAINOSUS, NOT INTRACTABLE: ICD-10-CM

## 2023-10-06 DIAGNOSIS — G43.709 CHRONIC MIGRAINE WITHOUT AURA WITHOUT STATUS MIGRAINOSUS, NOT INTRACTABLE: ICD-10-CM

## 2023-10-06 DIAGNOSIS — E78.00 HIGH BLOOD CHOLESTEROL: ICD-10-CM

## 2023-10-06 DIAGNOSIS — F33.40 RECURRENT MAJOR DEPRESSIVE DISORDER, IN REMISSION (H): ICD-10-CM

## 2023-10-06 DIAGNOSIS — E78.5 HYPERLIPIDEMIA LDL GOAL <100: ICD-10-CM

## 2023-10-06 LAB
BASO+EOS+MONOS # BLD AUTO: ABNORMAL 10*3/UL
BASO+EOS+MONOS NFR BLD AUTO: ABNORMAL %
BASOPHILS # BLD AUTO: 0 10E3/UL (ref 0–0.2)
BASOPHILS NFR BLD AUTO: 0 %
CHOLEST SERPL-MCNC: 169 MG/DL
EOSINOPHIL # BLD AUTO: 0.3 10E3/UL (ref 0–0.7)
EOSINOPHIL NFR BLD AUTO: 2 %
ERYTHROCYTE [DISTWIDTH] IN BLOOD BY AUTOMATED COUNT: 13.5 % (ref 10–15)
HCT VFR BLD AUTO: 44.5 % (ref 35–47)
HDLC SERPL-MCNC: 46 MG/DL
HGB BLD-MCNC: 14.7 G/DL (ref 11.7–15.7)
IMM GRANULOCYTES # BLD: 0 10E3/UL
IMM GRANULOCYTES NFR BLD: 0 %
LDLC SERPL CALC-MCNC: 106 MG/DL
LYMPHOCYTES # BLD AUTO: 3.7 10E3/UL (ref 0.8–5.3)
LYMPHOCYTES NFR BLD AUTO: 28 %
MCH RBC QN AUTO: 28.4 PG (ref 26.5–33)
MCHC RBC AUTO-ENTMCNC: 33 G/DL (ref 31.5–36.5)
MCV RBC AUTO: 86 FL (ref 78–100)
MONOCYTES # BLD AUTO: 0.7 10E3/UL (ref 0–1.3)
MONOCYTES NFR BLD AUTO: 5 %
NEUTROPHILS # BLD AUTO: 8.4 10E3/UL (ref 1.6–8.3)
NEUTROPHILS NFR BLD AUTO: 64 %
NONHDLC SERPL-MCNC: 123 MG/DL
PLATELET # BLD AUTO: 389 10E3/UL (ref 150–450)
RBC # BLD AUTO: 5.17 10E6/UL (ref 3.8–5.2)
TRIGL SERPL-MCNC: 87 MG/DL
WBC # BLD AUTO: 13.1 10E3/UL (ref 4–11)

## 2023-10-06 PROCEDURE — 36415 COLL VENOUS BLD VENIPUNCTURE: CPT

## 2023-10-06 PROCEDURE — 80061 LIPID PANEL: CPT

## 2023-10-06 PROCEDURE — 85025 COMPLETE CBC W/AUTO DIFF WBC: CPT

## 2023-10-10 ENCOUNTER — HOSPITAL ENCOUNTER (OUTPATIENT)
Dept: MAMMOGRAPHY | Facility: CLINIC | Age: 43
Discharge: HOME | End: 2023-10-10
Attending: INTERNAL MEDICINE | Admitting: INTERNAL MEDICINE
Payer: COMMERCIAL

## 2023-10-10 DIAGNOSIS — Z12.31 VISIT FOR SCREENING MAMMOGRAM: ICD-10-CM

## 2023-10-10 PROCEDURE — 77067 SCR MAMMO BI INCL CAD: CPT

## 2023-10-23 ENCOUNTER — VIRTUAL VISIT (OUTPATIENT)
Dept: PHARMACY | Facility: CLINIC | Age: 43
End: 2023-10-23
Payer: COMMERCIAL

## 2023-10-23 DIAGNOSIS — E78.2 MIXED HYPERLIPIDEMIA: ICD-10-CM

## 2023-10-23 DIAGNOSIS — Z72.0 TOBACCO ABUSE: Primary | ICD-10-CM

## 2023-10-23 PROCEDURE — 99207 PR NO CHARGE LOS: CPT | Mod: VID | Performed by: PHARMACIST

## 2023-10-23 NOTE — LETTER
Medication List        Prepared on: 10/23/2023     Bring your Medication List when you go to the doctor, hospital, or   emergency room. And, share it with your family or caregivers.     Note any changes to how you take your medications.  Cross out medications when you no longer use them.    Medication How I take it Why I use it Prescriber   almotriptan (AXERT) 12.5 MG tablet Take 1 tablet (12.5 mg) by mouth daily as needed for migraine Migraine without aura and without status migrainosus, not intractable Cal Walker MD   echincea extract capsule Take 500 mg by mouth daily   Patient Reported   FLUoxetine (PROZAC) 20 MG capsule Take 1 capsule (20 mg) by mouth daily Recurrent major depressive disorder, in remission (H) Cal Walker MD   IBUPROFEN PO Take by mouth as needed for moderate pain High blood cholesterol Patient Reported   metoclopramide (REGLAN) 10 MG tablet Take 10 mg by mouth daily   Patient Reported   nicotine (NICODERM CQ) 21 MG/24HR 24 hr patch Place 1 patch onto the skin every 24 hours Tobacco Abuse Cal Walker MD   norgestimate-ethinyl estradiol (ORTHO-CYCLEN, SPRINTEC) 0.25-35 MG-MCG per tablet Take 1 tablet by mouth daily Nonintractable migraine, unspecified migraine type; Other depression Patient Reported   pantoprazole (PROTONIX) 40 MG EC tablet Take 40 mg by mouth daily   Patient Reported   propranolol ER (INDERAL LA) 80 MG 24 hr capsule Take 1 capsule (80 mg) by mouth daily Chronic migraine without aura without status migrainosus, not intractable Cal Walker MD   simvastatin (ZOCOR) 20 MG tablet Take 1 tablet (20 mg) by mouth At Bedtime Hyperlipidemia LDL Goal <100 Cal Walker MD   varenicline (CHANTIX) 1 MG tablet Take 1 tablet (1 mg) by mouth 2 times daily Tobacco Abuse Cal Walker MD         Add new medications, over-the-counter drugs, herbals, vitamins, or  minerals in the blank rows below.    Medication How I take it Why I use it Prescriber                                       Allergies:      ceclor [cefaclor]; penicillins; temovate [clobetasol]; doxycycline        Side effects I have had:               Other Information:              My notes and questions:

## 2023-10-23 NOTE — LETTER
October 23, 2023  Majo Oreilly  [address] [place] [address]    Dear Ms. Oreilly, [facility]     Thank you for talking with me on Oct 23, 2023 about your health and medications. As a follow-up to our conversation, I have included two documents:      Your Recommended To-Do List has steps you should take to get the best results from your medications.  Your Medication List will help you keep track of your medications and how to take them.    If you want to talk about these documents, please call Sophy Zuniga at phone: [phone], Monday-Friday 8-4:30pm.    I look forward to working with you and your doctors to make sure your medications work well for you.    Sincerely,  Cal KERN Pharmacist, [facility]

## 2023-10-23 NOTE — PATIENT INSTRUCTIONS
"Recommendations from today's MTM visit:                                                    MTM (medication therapy management) is a service provided by a clinical pharmacist designed to help you get the most of out of your medicines.   Today we reviewed what your medicines are for, how to know if they are working, that your medicines are safe and how to make your medicine regimen as easy as possible.    Continue chantix 1 mg twice a day   Utilize nicotine patches if needed the extra help with cravings/urges     Follow-up: Return in about 29 days (around 11/21/2023) for Follow up, with me, using a video visit.    It was great speaking with you today.  I value your experience and would be very thankful for your time in providing feedback in our clinic survey. In the next few days, you may receive an email or text message from [employer] with a link to a survey related to your  clinical pharmacist.\"     To schedule another MTM appointment, please call the clinic directly or you may call the MTM scheduling line at [phone] or toll-free at [phone].     My Clinical Pharmacist's contact information:                                                      Please feel free to contact me with any questions or concerns you have.      Cal Fitch, Pharm. D., BCACP  Medication Therapy Management Pharmacist    "

## 2023-10-23 NOTE — PROGRESS NOTES
"Medication Therapy Management (MTM) Encounter    ASSESSMENT:                            Medication Adherence/Access: No issues identified    Smoking Cessation: the patient has been meeting goal of half a pack or less per day in the last two weeks. Since she has started meeting this goal recently, she could benefit from continuing on this goal until the next time we meet where we can assess a new goal. If motivated, could set a new goal to 1/4 a pack of cigarettes per day.     Hyperlipidemia: improved - no changes needed at this time     PLAN:                            Continue chantix 1 mg twice a day   Utilize nicotine patches if needed the extra help with cravings/urges   If motivated, could set a new goal to 1/4 a pack of cigarettes per day at future visit.     Follow-up: Return in about 29 days (around 11/21/2023) for Follow up, with me, using a video visit.     SUBJECTIVE/OBJECTIVE:                          Majo Oreilly is a 43 year old female called for a follow-up visit from 9/25/2023.       Reason for visit: Smoking cessation.    Allergies/ADRs: Reviewed in chart  Past Medical History: Reviewed in chart  Tobacco: She reports that she has been smoking cigarettes. She has a 15.00 pack-year smoking history. She has never used smokeless tobacco.Nicotine/Tobacco Cessation Plan:   Pharmacotherapies : varenicline (Chantix)    Alcohol: \"Very occasional\"     Medication Adherence/Access: no issues reported    Smoking Cessation:  -Chantix 1 mg twice a day     The patient has not been utilizing the Nicotine 21 mg/24hr patch daily as she feels like she does not need it and is doing just fine without it. She does have it on hand in case she does want to use it.     The last two weeks, she has been able to meet the goal of half a pack or less. She is not interested in any further goals at this point in time, but may be open to it at our next visit.     Hyperlipidemia:   Simvastatin 20 mg daily    Patient reports no " current medication side effects.  The 10-year ASCVD risk score (Kaley WASHINGTON, et al., 2019) is: 4%    Values used to calculate the score:      Age: 43 years      Sex: Female      Is Non- : No      Diabetic: No      Tobacco smoker: Yes      Systolic Blood Pressure: 137 mmHg      Is BP treated: Yes      HDL Cholesterol: 46 mg/dL      Total Cholesterol: 169 mg/dL  Recent Labs   Lab Test 10/06/23  1028 08/24/22  1537   CHOL 169 183   HDL 46* 46*   * 106*   TRIG 87 157*        Today's Vitals: There were no vitals taken for this visit.  ----------------      I spent 7 minutes with this patient today. All changes were made via collaborative practice agreement with Cal Walker MD. A copy of the visit note was provided to the patient's provider(s).    A summary of these recommendations was sent via [employer].    Natalie MarioD Student, on 10/23/2023 at 1:43 PM    Preceptor Cosignature: I have reviewed and verified the student's documentation.    Cal Fitch, Pharm. D., BCACP  Medication Therapy Management Pharmacist  Direct Voicemail: [phone]      Telemedicine Visit Details  Type of service:  Telephone visit  Start Time:  1:36 PM  End Time: 1:43 PM       Medication Therapy Recommendations  No medication therapy recommendations to display

## 2023-10-23 NOTE — LETTER
"Recommended To-Do List      Prepared on: 10/23/2023       You can get the best results from your medications by completing the items on this \"To-Do List.\"      Bring your To-Do List when you go to your doctor. And, share it with your family or caregivers.    My To-Do List:  What we talked about: What I should do:                     "

## 2023-10-27 ENCOUNTER — IMMUNIZATION (OUTPATIENT)
Dept: FAMILY MEDICINE | Facility: CLINIC | Age: 43
End: 2023-10-27
Payer: COMMERCIAL

## 2023-10-27 DIAGNOSIS — Z23 NEED FOR PROPHYLACTIC VACCINATION AND INOCULATION AGAINST INFLUENZA: Primary | ICD-10-CM

## 2023-10-27 PROCEDURE — 90471 IMMUNIZATION ADMIN: CPT

## 2023-10-27 PROCEDURE — 90686 IIV4 VACC NO PRSV 0.5 ML IM: CPT

## 2023-11-21 ENCOUNTER — VIRTUAL VISIT (OUTPATIENT)
Dept: PHARMACY | Facility: CLINIC | Age: 43
End: 2023-11-21
Payer: COMMERCIAL

## 2023-11-21 DIAGNOSIS — Z72.0 TOBACCO ABUSE: Primary | ICD-10-CM

## 2023-11-21 DIAGNOSIS — G43.909 MIGRAINE WITHOUT STATUS MIGRAINOSUS, NOT INTRACTABLE, UNSPECIFIED MIGRAINE TYPE: ICD-10-CM

## 2023-11-21 PROCEDURE — 99207 PR NO CHARGE LOS: CPT | Performed by: PHARMACIST

## 2023-11-21 NOTE — PROGRESS NOTES
"Medication Therapy Management (MTM) Encounter    ASSESSMENT:                            Medication Adherence/Access: No issues identified    Smoking:   Improved. Patient will plan to continue to decrease smoking. Recommended to use nicotine patch as prescribed and she was agreeable    Migraines:  Stable    PLAN:                            Continue to work on getting to goal of 6 cigarettes by next visit.  Start utilizing nicotine patches to help with cravings.     Follow-up: Return in about 5 weeks (around 12/28/2023) for Follow up, with me.      SUBJECTIVE/OBJECTIVE:                          Majo Oreilly is a 43 year old female called for a follow-up visit from 10/23.       Reason for visit: Smoking cessation.     Allergies/ADRs: Reviewed in chart  Past Medical History: Reviewed in chart  Tobacco: She reports that she has been smoking cigarettes. She has a 15.00 pack-year smoking history. She has never used smokeless tobacco.Nicotine/Tobacco Cessation Plan:   Pharmacotherapies : varenicline (Chantix) and Nicotine patch    Alcohol: \"Very occasional\"     Medication Adherence/Access: no issues reported    Smoking Cessation:  -Chantix 1 mg twice a day    Reports she has been able to cut back to 8 per day. Previously around a half a pack. Reports she has been at 8 cigarettes   The patient has not been utilizing the Nicotine 21 mg/24hr patch daily and will probably start here soon.        Migraines:   Propranolol 80 mg daily - working well  Almotriptan - hasn't had to take for 2 months.        Today's Vitals: There were no vitals taken for this visit.  ----------------      I spent 5 minutes with this patient today. All changes were made via collaborative practice agreement with Cal Walker MD. A copy of the visit note was provided to the patient's provider(s).    A summary of these recommendations was sent via [employer].    Cal Fitch, Pharm. D., [facility]  Medication Therapy Management Pharmacist      Telemedicine " Visit Details  Type of service:  Telephone visit  Start Time:  132 pm  End Time:  137  pm     Medication Therapy Recommendations  No medication therapy recommendations to display

## 2023-11-21 NOTE — PATIENT INSTRUCTIONS
"Recommendations from today's MTM visit:                                                    MTM (medication therapy management) is a service provided by a clinical pharmacist designed to help you get the most of out of your medicines.   Today we reviewed what your medicines are for, how to know if they are working, that your medicines are safe and how to make your medicine regimen as easy as possible.    Continue to work on getting to goal of 6 cigarettes by next visit.  Start utilizing nicotine patches to help with cravings.     Follow-up: Return in about 5 weeks (around 12/28/2023) for Follow up, with me.    It was great speaking with you today.  I value your experience and would be very thankful for your time in providing feedback in our clinic survey. In the next few days, you may receive an email or text message from [employer] with a link to a survey related to your  clinical pharmacist.\"     To schedule another MTM appointment, please call the clinic directly or you may call the MTM scheduling line at [phone] or toll-free at [phone].     My Clinical Pharmacist's contact information:                                                      Please feel free to contact me with any questions or concerns you have.      Cal Fitch, Pharm. D., BCACP  Medication Therapy Management Pharmacist    "

## 2023-12-20 DIAGNOSIS — F33.40 RECURRENT MAJOR DEPRESSIVE DISORDER, IN REMISSION (H): ICD-10-CM

## 2023-12-21 NOTE — TELEPHONE ENCOUNTER
FLUoxetine (PROZAC) 20 MG capsule   Called Pharmacy,  Pt picked up order yesterday and there are still refills on file.    No further action needed at this time.      Dianna Schaefer RN  Central Triage Red Flags/Med Refills        Disp Refills Start End CRIS    FLUoxetine (PROZAC) 20 MG capsule 90 capsule 3 7/17/2023 -- No   Sig - Route: Take 1 capsule (20 mg) by mouth daily - Oral   Sent to pharmacy as: FLUoxetine HCl 20 MG Oral Capsule (PROzac)   Class: E-Prescribe   Order: [id number]   E-Prescribing Status: Receipt confirmed by pharmacy (7/17/2023  6:10 PM CDT)     Printout Tracking    External Result Report     Pharmacy    [id number]  [place], [place] - [address]

## 2023-12-28 ENCOUNTER — VIRTUAL VISIT (OUTPATIENT)
Dept: PHARMACY | Facility: CLINIC | Age: 43
End: 2023-12-28
Payer: COMMERCIAL

## 2023-12-28 DIAGNOSIS — Z72.0 TOBACCO ABUSE: ICD-10-CM

## 2023-12-28 PROCEDURE — 99207 PR NO CHARGE LOS: CPT | Performed by: PHARMACIST

## 2023-12-28 RX ORDER — VARENICLINE TARTRATE 1 MG/1
1 TABLET, FILM COATED ORAL 2 TIMES DAILY
Qty: 60 TABLET | Refills: 3 | Status: SHIPPED | OUTPATIENT
Start: 2023-12-28

## 2023-12-28 NOTE — PATIENT INSTRUCTIONS
"Recommendations from today's MTM visit:                                                    MTM (medication therapy management) is a service provided by a clinical pharmacist designed to help you get the most of out of your medicines.   Today we reviewed what your medicines are for, how to know if they are working, that your medicines are safe and how to make your medicine regimen as easy as possible.    Plan to get to 2 cigarettes per day by next visit    Follow-up: Return in about 4 weeks (around 1/25/2024) for Follow up, with me, using a phone visit.    It was great speaking with you today.  I value your experience and would be very thankful for your time in providing feedback in our clinic survey. In the next few days, you may receive an email or text message from [employer] with a link to a survey related to your  clinical pharmacist.\"     To schedule another MTM appointment, please call the clinic directly or you may call the MTM scheduling line at [phone] or toll-free at [phone].     My Clinical Pharmacist's contact information:                                                      Please feel free to contact me with any questions or concerns you have.      Cal Fitch, Pharm. D., BCACP  Medication Therapy Management Pharmacist    "

## 2023-12-28 NOTE — PROGRESS NOTES
Medication Therapy Management (MTM) Encounter    ASSESSMENT:                            Medication Adherence/Access: No issues identified    Smoking:   Patient has done great at meeting goal of 6 or less cigarettes per day. Nicotine patches have been efficacious. Will refill Chantix as requested. Will plan to continue decreasing amount of daily cigarettes at each visit.    PLAN:                            Plan to get to 2 cigarettes per day by next visit    Follow-up: Return in about 4 weeks (around 1/25/2024) for Follow up, with me, using a phone visit.    SUBJECTIVE/OBJECTIVE:                          Majo Oreilly is a 43 year old female called for a follow-up visit from 11/21.       Reason for visit: smoking cessation.    Allergies/ADRs: Reviewed in chart  Past Medical History: Reviewed in chart  Tobacco: She reports that she has been smoking cigarettes. She has a 15 pack-year smoking history. She has never used smokeless tobacco.Nicotine/Tobacco Cessation Plan:   Pharmacotherapies : varenicline (Chantix) and Nicotine patch    Alcohol: occasional    Medication Adherence/Access: no issues reported      Smoking Cessation:  -Chantix 1 mg twice a day - reports she needs a refill of this  Nicotine 21 mg daily.    Reports she has been able to cut back to 5 per day. For Previously around a half a pack.  The patient has not been utilizing the Nicotine 21 mg/24hr patch daily and will probably start here soon.     Today's Vitals: There were no vitals taken for this visit.  ----------------      I spent 4 minutes with this patient today. All changes were made via collaborative practice agreement with Cal Walker MD. A copy of the visit note was provided to the patient's provider(s).    A summary of these recommendations was sent via [employer].    Cal Fitch, Pharm. D., BCACP  Medication Therapy Management Pharmacist      Telemedicine Visit Details  Type of service:  Telephone visit  Start Time:  1:31 pm  End Time:  1:35 PM     Medication Therapy Recommendations  No medication therapy recommendations to display

## 2024-01-25 ENCOUNTER — VIRTUAL VISIT (OUTPATIENT)
Dept: PHARMACY | Facility: CLINIC | Age: 44
End: 2024-01-25
Payer: COMMERCIAL

## 2024-01-25 DIAGNOSIS — Z72.0 TOBACCO ABUSE: Primary | ICD-10-CM

## 2024-01-25 DIAGNOSIS — Z30.018 HORMONAL CONTRACEPTIVE: ICD-10-CM

## 2024-01-25 DIAGNOSIS — G43.909 MIGRAINE WITHOUT STATUS MIGRAINOSUS, NOT INTRACTABLE, UNSPECIFIED MIGRAINE TYPE: ICD-10-CM

## 2024-01-25 DIAGNOSIS — F32.A DEPRESSION, UNSPECIFIED DEPRESSION TYPE: ICD-10-CM

## 2024-01-25 DIAGNOSIS — K21.9 GASTROESOPHAGEAL REFLUX DISEASE WITHOUT ESOPHAGITIS: ICD-10-CM

## 2024-01-25 DIAGNOSIS — Z78.9 TAKES DIETARY SUPPLEMENTS: ICD-10-CM

## 2024-01-25 DIAGNOSIS — K31.84 GASTROPARESIS: ICD-10-CM

## 2024-01-25 DIAGNOSIS — E78.2 MIXED HYPERLIPIDEMIA: ICD-10-CM

## 2024-01-25 PROCEDURE — 99207 PR NO CHARGE LOS: CPT | Mod: 93 | Performed by: PHARMACIST

## 2024-01-25 NOTE — PATIENT INSTRUCTIONS
"Recommendations from today's MTM visit:                                                    MTM (medication therapy management) is a service provided by a clinical pharmacist designed to help you get the most of out of your medicines.   Today we reviewed what your medicines are for, how to know if they are working, that your medicines are safe and how to make your medicine regimen as easy as possible.    Plan to get back to 5 cigarettes/day by next visit    Follow-up: Return in about 4 weeks (around 2/22/2024) for Follow up, with me.    It was great speaking with you today.  I value your experience and would be very thankful for your time in providing feedback in our clinic survey. In the next few days, you may receive an email or text message from [employer] with a link to a survey related to your  clinical pharmacist.\"     To schedule another MTM appointment, please call the clinic directly or you may call the MTM scheduling line at [phone] or toll-free at [phone].     My Clinical Pharmacist's contact information:                                                      Please feel free to contact me with any questions or concerns you have.      Cal Fitch, Pharm. D., [facility]  Medication Therapy Management Pharmacist    "

## 2024-01-25 NOTE — PROGRESS NOTES
Medication Therapy Management (MTM) Encounter    ASSESSMENT:                            Medication Adherence/Access: No issues identified    Smoking Cessation:  Patient has worsened in her cigarette use, however she is motivated to reduce back down.  Will target her previous level of 5 cigarettes/day.    Migraines:  Stable     Depression:  Stable     Gastroparesis:  Stable     Hormonal Therapy:  Stable     GERD:   Stable     Hyperlipidemia:   Stable    Supplements:  Stable    PLAN:                            Plan to get back to 5 cigarettes/day by next visit    Follow-up: Return in about 4 weeks (around 2/22/2024) for Follow up, with me.      SUBJECTIVE/OBJECTIVE:                          Majo Oreilly is a 43 year old female called for a follow-up visit from 12/28.       Reason for visit: smoking follow-up.    Allergies/ADRs: Reviewed in chart  Past Medical History: Reviewed in chart  Tobacco: She reports that she has been smoking cigarettes. She has a 15 pack-year smoking history. She has never used smokeless tobacco.Nicotine/Tobacco Cessation Plan  Pharmacotherapies : varenicline (Chantix) and Nicotine patch    Alcohol: occaisional    Medication Adherence/Access: no issues reported      Smoking Cessation:  -Chantix 1 mg twice a day - reports she needs a refill of this  Nicotine 21 mg daily.    Reports she has had some stressors recently and smoked more than she had in a long time and now she is a little over a half pack per day right now.   Reports she thinks there has been more weight gain than she has anticipated. Reprots she is good witht he medications she is currently using.     Migraines:  Almotriptan 12.5 mg daily as needed for migraines - reports using about once a month  Ibuprofen as needed - not often  Propranolol 80 mg daily and this has been helpful.      Had hives from sumatriptin. Almotrioptan has worked well for her.      Depression:  Fluoxetine 20 mg daily     Works well and No concerns or  questions at this time.      Gastroparesis:  Metoclopramide 10 mg takes once daily in the evening. Reports she has been able to go off for three days so far.      Hormonal Therapy:  Sprintec once daily.      No concerns or questions at this time.      GERD:   Pantoprazole 40 mg once daily   Patient reports no current symptoms.  Patient feels that current regimen is effective.      Hyperlipidemia:   simvastatin 20mg daily    Recent Labs   Lab Test 10/06/23  1028 08/24/22  1537   CHOL 169 183   HDL 46* 46*   * 106*   TRIG 87 157*     Supplements:  Echinacea 500 mg daily     No concerns or questions at this time.       Today's Vitals: There were no vitals taken for this visit.  ----------------      I spent 6 minutes with this patient today. All changes were made via collaborative practice agreement with Cal Walker MD. A copy of the visit note was provided to the patient's provider(s).    A summary of these recommendations was sent via [employer].    Cal Fitch, Pharm. D., BCACP  Medication Therapy Management Pharmacist      Telemedicine Visit Details  Type of service:  Telephone visit  Start Time: 1:34 PM  End Time: 1:40 PM     Medication Therapy Recommendations  No medication therapy recommendations to display

## 2024-02-22 ENCOUNTER — VIRTUAL VISIT (OUTPATIENT)
Dept: PHARMACY | Facility: CLINIC | Age: 44
End: 2024-02-22
Payer: COMMERCIAL

## 2024-02-22 DIAGNOSIS — Z72.0 TOBACCO ABUSE: Primary | ICD-10-CM

## 2024-02-22 PROCEDURE — 99207 PR NO CHARGE LOS: CPT | Mod: 93 | Performed by: PHARMACIST

## 2024-02-22 NOTE — PROGRESS NOTES
Medication Therapy Management (MTM) Encounter    ASSESSMENT:                            Medication Adherence/Access: No issues identified    Smoking cessation:   Patient has made good progress and decrease the amount of cigarettes per day.  Encourage patient to continue to work toward 0 cigarettes/day by removing cigarettes from car.  She will be able to stop the Chantix after 2 and half months of quitting and then can taper down on the nicotine patch 6 weeks after quitting.  Will have her converse with me going forward via [employer] and she is no longer covered for visits.    PLAN:                            Work on getting to 0 cigarettes by no longer having cigarettes available in the car.   When you have quit for 6 weeks please message me and I will send you the 14 mg patch for 2 weeks and then 7 mg patch for 2 weeks.  When you have quit for 2-1/2 months you can stop the Chantix    Follow-up: Return in about 3 months (around 5/22/2024) for Follow up, with me via [employer].    SUBJECTIVE/OBJECTIVE:                          Majo Oreilly is a 43 year old female called for a follow-up visit from 1/25.       Reason for visit: smoking follow-up.    Allergies/ADRs: Reviewed in chart  Past Medical History: Reviewed in chart  Tobacco: She reports that she has been smoking cigarettes. She has a 15 pack-year smoking history. She has never used smokeless tobacco.Nicotine/Tobacco Cessation Plan  Pharmacotherapies : varenicline (Chantix) and Nicotine patch    Alcohol: occaisional    Medication Adherence/Access: no issues reported    Smoking Cessation:   Chantix 1 mg twice daily  Nicotine 21 mg patch once daily - seems to be working    Reports she is down to 4 cigarettes per day. Reports she is not a person who would use much of a gum or lozenge. Reports that when she gets in the car not to do it. Breaking habits is the hardest thing for her. Plan is going to be to not have cigarettes in the car.      Today's Vitals: There were no  vitals taken for this visit.  ----------------      I spent 6 minutes with this patient today. All changes were made via collaborative practice agreement with Cal Walker MD. A copy of the visit note was provided to the patient's provider(s).    A summary of these recommendations was sent via [employer].    Cal Fitch, Pharm. D., [facility]  Medication Therapy Management Pharmacist    Telemedicine Visit Details  Type of service:  Telephone visit  Start Time:  1:35 PM  End Time: 1:41 PM     Medication Therapy Recommendations  No medication therapy recommendations to display

## 2024-02-22 NOTE — PATIENT INSTRUCTIONS
"Recommendations from today's MTM visit:                                                    MTM (medication therapy management) is a service provided by a clinical pharmacist designed to help you get the most of out of your medicines.   Today we reviewed what your medicines are for, how to know if they are working, that your medicines are safe and how to make your medicine regimen as easy as possible.    Work on getting to 0 cigarettes by no longer having cigarettes available in the car.   When you have quit for 6 weeks please message me and I will send you the 14 mg patch for 2 weeks and then 7 mg patch for 2 weeks.  When you have quit for 2-1/2 months you can stop the Chantix    Follow-up: Return in about 3 months (around 5/22/2024) for Follow up, with me via [employer].    It was great speaking with you today.  I value your experience and would be very thankful for your time in providing feedback in our clinic survey. In the next few days, you may receive an email or text message from [employer] with a link to a survey related to your  clinical pharmacist.\"     To schedule another MTM appointment, please call the clinic directly or you may call the MTM scheduling line at [phone] or toll-free at [phone].     My Clinical Pharmacist's contact information:                                                      Please feel free to contact me with any questions or concerns you have.      Cal Fitch, Pharm. D., [facility]  Medication Therapy Management Pharmacist    "

## 2024-06-12 ENCOUNTER — MYC MEDICAL ADVICE (OUTPATIENT)
Dept: INTERNAL MEDICINE | Facility: CLINIC | Age: 44
End: 2024-06-12
Payer: COMMERCIAL

## 2024-06-12 DIAGNOSIS — E28.2 PCOS (POLYCYSTIC OVARIAN SYNDROME): Primary | ICD-10-CM

## 2024-06-12 NOTE — TELEPHONE ENCOUNTER
Lv note:      7. Fertility issues; she still would like to conceive and has been following at the [facility]. She is aware she can't be pregnant on many of her medications. She may return to the [facility] after discontinuing smoking.

## 2024-06-14 NOTE — TELEPHONE ENCOUNTER
Ob/gyn referral faxed to [facility] via [employer]        Surinder Harrington CMA ([facility]) at 1:31 PM on 6/14/2024

## 2024-08-27 ENCOUNTER — LAB REQUISITION (OUTPATIENT)
Dept: LAB | Facility: CLINIC | Age: 44
End: 2024-08-27

## 2024-08-27 DIAGNOSIS — Z12.4 ENCOUNTER FOR SCREENING FOR MALIGNANT NEOPLASM OF CERVIX: ICD-10-CM

## 2024-08-27 PROCEDURE — 87624 HPV HI-RISK TYP POOLED RSLT: CPT | Performed by: OBSTETRICS & GYNECOLOGY

## 2024-08-27 PROCEDURE — G0145 SCR C/V CYTO,THINLAYER,RESCR: HCPCS | Performed by: OBSTETRICS & GYNECOLOGY

## 2024-08-28 LAB
HPV HR 12 DNA CVX QL NAA+PROBE: NEGATIVE
HPV16 DNA CVX QL NAA+PROBE: NEGATIVE
HPV18 DNA CVX QL NAA+PROBE: NEGATIVE
HUMAN PAPILLOMA VIRUS FINAL DIAGNOSIS: NORMAL

## 2024-09-02 DIAGNOSIS — G43.709 CHRONIC MIGRAINE WITHOUT AURA WITHOUT STATUS MIGRAINOSUS, NOT INTRACTABLE: ICD-10-CM

## 2024-09-03 LAB
BKR AP ASSOCIATED HPV REPORT: NORMAL
BKR LAB AP GYN ADEQUACY: NORMAL
BKR LAB AP GYN INTERPRETATION: NORMAL
BKR LAB AP LMP: NORMAL
BKR LAB AP PREVIOUS ABNL DX: NORMAL
BKR LAB AP PREVIOUS ABNORMAL: NORMAL
PATH REPORT.COMMENTS IMP SPEC: NORMAL
PATH REPORT.COMMENTS IMP SPEC: NORMAL
PATH REPORT.RELEVANT HX SPEC: NORMAL

## 2024-09-05 RX ORDER — PROPRANOLOL HYDROCHLORIDE 80 MG/1
80 CAPSULE, EXTENDED RELEASE ORAL DAILY
Qty: 90 CAPSULE | Refills: 0 | Status: SHIPPED | OUTPATIENT
Start: 2024-09-05

## 2024-09-05 NOTE — TELEPHONE ENCOUNTER
Medication Requested:  propranolol ER (INDERAL LA) 80 MG 24 hr capsule 90 capsule 3 7/17/2023 -- No   Sig - Route: Take 1 capsule (80 mg) by mouth daily - Oral     ----------------------  Last Office Visit : 7/17/2023  [facility] [place]      Future Office visit:    10/21/2024 1:00 PM (30 min)  Thi    Arrive by: 12:45 PM   [facility] RETURN   [facility] ([facility])   Cal Walker MD     ----------------------      Refill decision: Medication refilled per protocol.      Overdue for BP recheck - appt 10/21/24   90 day shalonda refill given    Pass/Fail Protocol Criteria:    Beta-Blockers Protocol [id number]/05/2024 12:18 PM   Protocol Details Blood pressure under 140/90 in past 12 months    Recent (12 mo) or future (90 days) visit within the authorizing provider's specialty        BP Readings from Last 3 Encounters:   07/17/23 137/89   08/24/22 (!) 145/97   02/07/20 112/77

## 2024-09-17 DIAGNOSIS — F33.40 RECURRENT MAJOR DEPRESSIVE DISORDER, IN REMISSION (H): ICD-10-CM

## 2024-09-17 NOTE — TELEPHONE ENCOUNTER
Medication Requested:  FLUoxetine (PROZAC) 20 MG capsule 90 capsule 3 7/17/2023 -- No   Sig - Route: Take 1 capsule (20 mg) by mouth daily - Oral     ----------------------  Last Office Visit : 7/17/2023  [facility] Office visit:     10/21/2024 1:00 PM (30 min)  Thi   Arrive by: 12:45 PM   [facility] RETURN   [facility] ([facility])   aCl Walker MD     ----------------------      Refill decision: Medication refilled per protocol.    -Overdue PHQ9 - have patient complete at upcoming visit.  -90 days shalonda refill given.    PHQ-9 score:        5/10/2021    10:26 AM   PHQ   PHQ-9 Total Score 0   Q9: Thoughts of better off dead/self-harm past 2 weeks Not at all               Pass/Fail Protocol Criteria:  SSRIs Protocol [id number]/17/2024 10:26 AM   Protocol Details PHQ-9 score less than 5 in past 6 months         Warnings Override History for FLUoxetine (PROZAC) 20 MG capsule [[id number]]    Overridden by Cal Walker MD on Jul 17, 2023 6:10 PM   Drug-Drug   1. IBUPROFEN / SELECTIVE SEROTONIN REUPTAKE INHIBITORS [Level: Major]   Other Orders: IBUPROFEN PO      2. IBUPROFEN / SELECTIVE SEROTONIN REUPTAKE INHIBITORS [Level: Major]   Other Orders: IBUPROFEN PO

## 2024-10-11 ENCOUNTER — HOSPITAL ENCOUNTER (OUTPATIENT)
Dept: MAMMOGRAPHY | Facility: CLINIC | Age: 44
Discharge: HOME | End: 2024-10-11
Attending: INTERNAL MEDICINE | Admitting: INTERNAL MEDICINE
Payer: COMMERCIAL

## 2024-10-11 DIAGNOSIS — E78.5 HYPERLIPIDEMIA LDL GOAL <100: ICD-10-CM

## 2024-10-11 DIAGNOSIS — Z12.31 VISIT FOR SCREENING MAMMOGRAM: ICD-10-CM

## 2024-10-11 PROCEDURE — 77063 BREAST TOMOSYNTHESIS BI: CPT

## 2024-10-11 RX ORDER — SIMVASTATIN 20 MG
20 TABLET ORAL AT BEDTIME
Qty: 90 TABLET | Refills: 0 | Status: SHIPPED | OUTPATIENT
Start: 2024-10-11 | End: 2024-10-21

## 2024-10-12 NOTE — TELEPHONE ENCOUNTER
Medication Requested:  simvastatin (ZOCOR) 20 MG tablet 90 tablet 3 7/17/2023 -- No   Sig - Route: Take 1 tablet (20 mg) by mouth At Bedtime - Oral     ----------------------  Last Office Visit : 7/17/2023  [facility] Office visit:     10/21/2024 1:00 PM (30 min)  Thi   Arrive by: 12:45 PM   [facility] RETURN   [facility] ([facility])   Cal Walker MD     ----------------------      Refill decision: Medication refilled per protocol.    Overdue labs/test:  LDL - labs need to be ordered. 90 day shalonda refill given.      Pass/Fail Protocol Criteria:  LDL Cholesterol Calculated   Date Value Ref Range Status   10/06/2023 106 (H) <=100 mg/dL Final   02/07/2020 80 <100 mg/dL Final     Comment:     Desirable:       <100 mg/dl

## 2024-10-20 NOTE — PROGRESS NOTES
HPI:    Overall doing well. She continues to follow at [facility] fertility and states she was seen a few weeks ago and has a pelvic MRI study scheduled for the future. Otherwise, no additional HEENT, cardiopulmonary, abdominal, , GYN, neurological, systemic, psychiatric, lymphatic, endocrine, vascular complaints.     Past Medical History:   Diagnosis Date    Alopecia     Combined hyperlipidemia     Depression     Migraines     PCOS (polycystic ovarian syndrome)     PTSD (post-traumatic stress disorder)      Past Surgical History:   Procedure Laterality Date    EXTRACTION(S) DENTAL      GYN SURGERY  [facility] 2007    had ovarian cyst removed, had second surgery approx 2012    LASIK BILATERAL      left salpingo      ORTHOPEDIC SURGERY  approx 2009    knee surgery    SALPINGO OOPHORECTOMY,R/L/MARIBEL      left side     PE;    Vitals noted, gen nad, cooperative, alert, neck supple nl rom, lungs with good air movement, RRR, S1, S2, no MRG, abdomen, no acute findings Grossly normal neurological exam.     Results for orders placed or performed in visit on 10/21/24   Comprehensive metabolic panel     Status: Abnormal   Result Value Ref Range    Sodium 134 (L) 135 - 145 mmol/L    Potassium 4.1 3.4 - 5.3 mmol/L    Carbon Dioxide (CO2) 21 (L) 22 - 29 mmol/L    Anion Gap 11 7 - 15 mmol/L    Urea Nitrogen 4.4 (L) 6.0 - 20.0 mg/dL    Creatinine 0.76 0.51 - 0.95 mg/dL    GFR Estimate >90 >60 mL/min/1.73m2    Calcium 9.0 8.8 - 10.4 mg/dL    Chloride 102 98 - 107 mmol/L    Glucose 85 70 - 99 mg/dL    Alkaline Phosphatase 90 40 - 150 U/L    AST 30 0 - 45 U/L    ALT 59 (H) 0 - 50 U/L    Protein Total 7.0 6.4 - 8.3 g/dL    Albumin 4.3 3.5 - 5.2 g/dL    Bilirubin Total 0.5 <=1.2 mg/dL    Patient Fasting > 8hrs? No    Lipid panel reflex to direct LDL Fasting     Status: Abnormal   Result Value Ref Range    Cholesterol 142 <200 mg/dL    Triglycerides 136 <150 mg/dL    Direct Measure HDL 34 (L) >=50 mg/dL    LDL Cholesterol Calculated 81 <100  mg/dL    Non HDL Cholesterol 108 <130 mg/dL    Patient Fasting > 8hrs? No     Narrative    Cholesterol  Desirable: < 200 mg/dL  Borderline High: 200 - 239 mg/dL  High: >= 240 mg/dL    Triglycerides  Normal: < 150 mg/dL  Borderline High: 150 - 199 mg/dL  High: 200-499 mg/dL  Very High: >= 500 mg/dL    Direct Measure HDL  Female: >= 50 mg/dL   Male: >= 40 mg/dL    LDL Cholesterol  Desirable: < 100 mg/dL  Above Desirable: 100 - 129 mg/dL   Borderline High: 130 - 159 mg/dL   High:  160 - 189 mg/dL   Very High: >= 190 mg/dL    Non HDL Cholesterol  Desirable: < 130 mg/dL  Above Desirable: 130 - 159 mg/dL  Borderline High: 160 - 189 mg/dL  High: 190 - 219 mg/dL  Very High: >= 220 mg/dL   CBC with platelets and differential     Status: Abnormal   Result Value Ref Range    WBC Count 16.6 (H) 4.0 - 11.0 10e3/uL    RBC Count 5.16 3.80 - 5.20 10e6/uL    Hemoglobin 15.1 11.7 - 15.7 g/dL    Hematocrit 43.1 35.0 - 47.0 %    MCV 84 78 - 100 fL    MCH 29.3 26.5 - 33.0 pg    MCHC 35.0 31.5 - 36.5 g/dL    RDW 14.2 10.0 - 15.0 %    Platelet Count 363 150 - 450 10e3/uL    % Neutrophils 72 %    % Lymphocytes 22 %    % Monocytes 5 %    % Eosinophils 1 %    % Basophils 0 %    % Immature Granulocytes 1 %    NRBCs per 100 WBC 0 <1 /100    Absolute Neutrophils 11.9 (H) 1.6 - 8.3 10e3/uL    Absolute Lymphocytes 3.6 0.8 - 5.3 10e3/uL    Absolute Monocytes 0.8 0.0 - 1.3 10e3/uL    Absolute Eosinophils 0.2 0.0 - 0.7 10e3/uL    Absolute Basophils 0.1 0.0 - 0.2 10e3/uL    Absolute Immature Granulocytes 0.1 <=0.4 10e3/uL    Absolute NRBCs 0.0 10e3/uL   CBC with platelets and differential     Status: Abnormal    Narrative    The following orders were created for panel order CBC with platelets and differential.  Procedure                               Abnormality         Status                     ---------                               -----------         ------                     CBC with platelets and d...[[id number]]  Abnormal            Final  result                 Please view results for these tests on the individual orders.         A/P:    1. Immunizations; Tdap 5/3/2016. Pfizer COVID vaccine x 2. Moderna COVID x 1. Influenza vaccine today 10/21/2024.   2. Increased lipids on Simvastatin; lipids; 10/6/2023; HDL 46,  and TG's 87   3. Migraines; PRN Axert  4. Depression on Fluoxetine; no change and this is beneficial.   5. EGD 6/1/2022 and 7/29/2022 Pathology noted in EMR; no reports. Gastric emptying study done 7/18/2022; normal. On Protonix   6. Mammogram; 10/11/2024  7. Fertility issues; she still would like to conceive and has been following at the [facility]. She is aware she can't be pregnant on many of her medications.   8. MTM note from Cal Fitch 2/22/2024, Medication management for smoking cessation.   9. PAP/pelvic 8/27/2024.   10. Dermatology referral for skin screening check        30 minutes spent on the date of the encounter doing chart review, history and exam, documentation and further activities as noted above

## 2024-10-21 ENCOUNTER — OFFICE VISIT (OUTPATIENT)
Dept: INTERNAL MEDICINE | Facility: CLINIC | Age: 44
End: 2024-10-21
Payer: COMMERCIAL

## 2024-10-21 ENCOUNTER — LAB (OUTPATIENT)
Dept: LAB | Facility: CLINIC | Age: 44
End: 2024-10-21
Payer: COMMERCIAL

## 2024-10-21 VITALS
OXYGEN SATURATION: 97 % | SYSTOLIC BLOOD PRESSURE: 128 MMHG | HEART RATE: 79 BPM | TEMPERATURE: 97.4 F | WEIGHT: 207.6 LBS | DIASTOLIC BLOOD PRESSURE: 89 MMHG | BODY MASS INDEX: 33.52 KG/M2

## 2024-10-21 DIAGNOSIS — Z12.83 SKIN CANCER SCREENING: ICD-10-CM

## 2024-10-21 DIAGNOSIS — E78.00 HIGH BLOOD CHOLESTEROL: ICD-10-CM

## 2024-10-21 DIAGNOSIS — G43.009 MIGRAINE WITHOUT AURA AND WITHOUT STATUS MIGRAINOSUS, NOT INTRACTABLE: ICD-10-CM

## 2024-10-21 DIAGNOSIS — E78.5 HYPERLIPIDEMIA LDL GOAL <100: ICD-10-CM

## 2024-10-21 DIAGNOSIS — G43.709 CHRONIC MIGRAINE WITHOUT AURA WITHOUT STATUS MIGRAINOSUS, NOT INTRACTABLE: ICD-10-CM

## 2024-10-21 DIAGNOSIS — E78.00 HIGH BLOOD CHOLESTEROL: Primary | ICD-10-CM

## 2024-10-21 DIAGNOSIS — F33.40 RECURRENT MAJOR DEPRESSIVE DISORDER, IN REMISSION (H): ICD-10-CM

## 2024-10-21 LAB
ALBUMIN SERPL BCG-MCNC: 4.3 G/DL (ref 3.5–5.2)
ALP SERPL-CCNC: 90 U/L (ref 40–150)
ALT SERPL W P-5'-P-CCNC: 59 U/L (ref 0–50)
ANION GAP SERPL CALCULATED.3IONS-SCNC: 11 MMOL/L (ref 7–15)
AST SERPL W P-5'-P-CCNC: 30 U/L (ref 0–45)
BASOPHILS # BLD AUTO: 0.1 10E3/UL (ref 0–0.2)
BASOPHILS NFR BLD AUTO: 0 %
BILIRUB SERPL-MCNC: 0.5 MG/DL
BUN SERPL-MCNC: 4.4 MG/DL (ref 6–20)
CALCIUM SERPL-MCNC: 9 MG/DL (ref 8.8–10.4)
CHLORIDE SERPL-SCNC: 102 MMOL/L (ref 98–107)
CHOLEST SERPL-MCNC: 142 MG/DL
CREAT SERPL-MCNC: 0.76 MG/DL (ref 0.51–0.95)
EGFRCR SERPLBLD CKD-EPI 2021: >90 ML/MIN/1.73M2
EOSINOPHIL # BLD AUTO: 0.2 10E3/UL (ref 0–0.7)
EOSINOPHIL NFR BLD AUTO: 1 %
ERYTHROCYTE [DISTWIDTH] IN BLOOD BY AUTOMATED COUNT: 14.2 % (ref 10–15)
FASTING STATUS PATIENT QL REPORTED: NO
FASTING STATUS PATIENT QL REPORTED: NO
GLUCOSE SERPL-MCNC: 85 MG/DL (ref 70–99)
HCO3 SERPL-SCNC: 21 MMOL/L (ref 22–29)
HCT VFR BLD AUTO: 43.1 % (ref 35–47)
HDLC SERPL-MCNC: 34 MG/DL
HGB BLD-MCNC: 15.1 G/DL (ref 11.7–15.7)
IMM GRANULOCYTES # BLD: 0.1 10E3/UL
IMM GRANULOCYTES NFR BLD: 1 %
LDLC SERPL CALC-MCNC: 81 MG/DL
LYMPHOCYTES # BLD AUTO: 3.6 10E3/UL (ref 0.8–5.3)
LYMPHOCYTES NFR BLD AUTO: 22 %
MCH RBC QN AUTO: 29.3 PG (ref 26.5–33)
MCHC RBC AUTO-ENTMCNC: 35 G/DL (ref 31.5–36.5)
MCV RBC AUTO: 84 FL (ref 78–100)
MONOCYTES # BLD AUTO: 0.8 10E3/UL (ref 0–1.3)
MONOCYTES NFR BLD AUTO: 5 %
NEUTROPHILS # BLD AUTO: 11.9 10E3/UL (ref 1.6–8.3)
NEUTROPHILS NFR BLD AUTO: 72 %
NONHDLC SERPL-MCNC: 108 MG/DL
NRBC # BLD AUTO: 0 10E3/UL
NRBC BLD AUTO-RTO: 0 /100
PLATELET # BLD AUTO: 363 10E3/UL (ref 150–450)
POTASSIUM SERPL-SCNC: 4.1 MMOL/L (ref 3.4–5.3)
PROT SERPL-MCNC: 7 G/DL (ref 6.4–8.3)
RBC # BLD AUTO: 5.16 10E6/UL (ref 3.8–5.2)
SODIUM SERPL-SCNC: 134 MMOL/L (ref 135–145)
TRIGL SERPL-MCNC: 136 MG/DL
WBC # BLD AUTO: 16.6 10E3/UL (ref 4–11)

## 2024-10-21 PROCEDURE — 85025 COMPLETE CBC W/AUTO DIFF WBC: CPT | Performed by: PATHOLOGY

## 2024-10-21 PROCEDURE — 90471 IMMUNIZATION ADMIN: CPT | Performed by: INTERNAL MEDICINE

## 2024-10-21 PROCEDURE — 80053 COMPREHEN METABOLIC PANEL: CPT | Performed by: PATHOLOGY

## 2024-10-21 PROCEDURE — 80061 LIPID PANEL: CPT | Performed by: PATHOLOGY

## 2024-10-21 PROCEDURE — 99214 OFFICE O/P EST MOD 30 MIN: CPT | Mod: 25 | Performed by: INTERNAL MEDICINE

## 2024-10-21 PROCEDURE — 36415 COLL VENOUS BLD VENIPUNCTURE: CPT | Performed by: PATHOLOGY

## 2024-10-21 PROCEDURE — 90656 IIV3 VACC NO PRSV 0.5 ML IM: CPT | Performed by: INTERNAL MEDICINE

## 2024-10-21 RX ORDER — ALMOTRIPTAN 12.5 MG/1
12.5 TABLET, FILM COATED ORAL DAILY PRN
Qty: 36 TABLET | Refills: 3 | Status: SHIPPED | OUTPATIENT
Start: 2024-10-21

## 2024-10-21 RX ORDER — PROPRANOLOL HYDROCHLORIDE 80 MG/1
80 CAPSULE, EXTENDED RELEASE ORAL DAILY
Qty: 90 CAPSULE | Refills: 3 | Status: SHIPPED | OUTPATIENT
Start: 2024-10-21

## 2024-10-21 RX ORDER — TIRZEPATIDE 10 MG/.5ML
INJECTION, SOLUTION SUBCUTANEOUS
COMMUNITY
Start: 2024-10-16

## 2024-10-21 RX ORDER — SIMVASTATIN 20 MG
20 TABLET ORAL AT BEDTIME
Qty: 90 TABLET | Refills: 3 | Status: SHIPPED | OUTPATIENT
Start: 2024-10-21

## 2024-10-23 ENCOUNTER — TELEPHONE (OUTPATIENT)
Dept: INTERNAL MEDICINE | Facility: CLINIC | Age: 44
End: 2024-10-23

## 2024-10-23 NOTE — TELEPHONE ENCOUNTER
Retail Pharmacy Prior Authorization Team   Phone: [phone]    PRIOR AUTHORIZATION DENIED    Medication: ALMOTRIPTAN MALATE 12.5 MG PO TABS  Insurance Company: [employer] - Phone [phone] Fax [phone]  Denial Date: 10/23/2024  Denial Reason(s): MUST TRY/FAIL THREE FORMULARY ALTERNATIVES - ELETRIPTAN, SUMATRIPTAN, AND RIZATRIPTAN      Appeal Information: IF THE PROVIDER WOULD LIKE TO APPEAL THIS DECISION PLEASE PROVIDE THE PA TEAM WITH A LETTER OF MEDICAL NECESSITY      Patient Notified: NO

## 2024-10-26 NOTE — TELEPHONE ENCOUNTER
Would have Payton look into this for possible alternatives and insurance coverage    Thanks, RENY Walker

## 2024-11-04 ENCOUNTER — LAB (OUTPATIENT)
Dept: LAB | Facility: CLINIC | Age: 44
End: 2024-11-04
Payer: COMMERCIAL

## 2024-11-04 DIAGNOSIS — D72.829 LEUKOCYTOSIS, UNSPECIFIED TYPE: ICD-10-CM

## 2024-11-04 LAB
ALBUMIN SERPL BCG-MCNC: 4.2 G/DL (ref 3.5–5.2)
ALP SERPL-CCNC: 79 U/L (ref 40–150)
ALT SERPL W P-5'-P-CCNC: 30 U/L (ref 0–50)
AST SERPL W P-5'-P-CCNC: 17 U/L (ref 0–45)
BASOPHILS # BLD AUTO: 0 10E3/UL (ref 0–0.2)
BASOPHILS NFR BLD AUTO: 0 %
BILIRUB DIRECT SERPL-MCNC: <0.2 MG/DL (ref 0–0.3)
BILIRUB SERPL-MCNC: 0.4 MG/DL
EOSINOPHIL # BLD AUTO: 0.3 10E3/UL (ref 0–0.7)
EOSINOPHIL NFR BLD AUTO: 2 %
ERYTHROCYTE [DISTWIDTH] IN BLOOD BY AUTOMATED COUNT: 13.9 % (ref 10–15)
HCT VFR BLD AUTO: 42.6 % (ref 35–47)
HGB BLD-MCNC: 14.9 G/DL (ref 11.7–15.7)
IMM GRANULOCYTES # BLD: 0 10E3/UL
IMM GRANULOCYTES NFR BLD: 0 %
LAB DIRECTOR COMMENTS: NORMAL
LAB DIRECTOR COMMENTS: NORMAL
LAB DIRECTOR DISCLAIMER: NORMAL
LAB DIRECTOR DISCLAIMER: NORMAL
LAB DIRECTOR INTERPRETATION: NORMAL
LAB DIRECTOR INTERPRETATION: NORMAL
LAB DIRECTOR METHODOLOGY: NORMAL
LAB DIRECTOR METHODOLOGY: NORMAL
LAB DIRECTOR RESULTS: NORMAL
LAB DIRECTOR RESULTS: NORMAL
LYMPHOCYTES # BLD AUTO: 4.2 10E3/UL (ref 0.8–5.3)
LYMPHOCYTES NFR BLD AUTO: 37 %
MCH RBC QN AUTO: 29.7 PG (ref 26.5–33)
MCHC RBC AUTO-ENTMCNC: 35 G/DL (ref 31.5–36.5)
MCV RBC AUTO: 85 FL (ref 78–100)
MONOCYTES # BLD AUTO: 0.6 10E3/UL (ref 0–1.3)
MONOCYTES NFR BLD AUTO: 5 %
NEUTROPHILS # BLD AUTO: 6.2 10E3/UL (ref 1.6–8.3)
NEUTROPHILS NFR BLD AUTO: 55 %
NRBC # BLD AUTO: 0 10E3/UL
NRBC BLD AUTO-RTO: 0 /100
PLATELET # BLD AUTO: 355 10E3/UL (ref 150–450)
PROT SERPL-MCNC: 6.7 G/DL (ref 6.4–8.3)
RBC # BLD AUTO: 5.02 10E6/UL (ref 3.8–5.2)
RETICS # AUTO: 0.08 10E6/UL (ref 0.03–0.1)
RETICS/RBC NFR AUTO: 1.5 % (ref 0.5–2)
SPECIMEN DESCRIPTION: NORMAL
SPECIMEN DESCRIPTION: NORMAL
WBC # BLD AUTO: 11.3 10E3/UL (ref 4–11)

## 2024-11-04 PROCEDURE — 85025 COMPLETE CBC W/AUTO DIFF WBC: CPT

## 2024-11-04 PROCEDURE — 81207 BCR/ABL1 GENE MINOR BP: CPT

## 2024-11-04 PROCEDURE — G0452 MOLECULAR PATHOLOGY INTERPR: HCPCS | Mod: 59 | Performed by: PATHOLOGY

## 2024-11-04 PROCEDURE — 85045 AUTOMATED RETICULOCYTE COUNT: CPT

## 2024-11-04 PROCEDURE — 81450 HL NEO GSAP 5-50DNA/DNA&RNA: CPT | Performed by: INTERNAL MEDICINE

## 2024-11-04 PROCEDURE — 80076 HEPATIC FUNCTION PANEL: CPT

## 2024-11-04 PROCEDURE — 99207 BLOOD MORPHOLOGY PATHOLOGIST REVIEW: CPT | Performed by: PATHOLOGY

## 2024-11-04 PROCEDURE — 81206 BCR/ABL1 GENE MAJOR BP: CPT

## 2024-11-04 PROCEDURE — 36415 COLL VENOUS BLD VENIPUNCTURE: CPT

## 2024-11-05 LAB
PATH REPORT.COMMENTS IMP SPEC: NORMAL
PATH REPORT.FINAL DX SPEC: NORMAL
PATH REPORT.MICROSCOPIC SPEC OTHER STN: NORMAL
PATH REPORT.MICROSCOPIC SPEC OTHER STN: NORMAL
PATH REPORT.RELEVANT HX SPEC: NORMAL

## 2024-11-05 NOTE — TELEPHONE ENCOUNTER
FUTURE VISIT INFORMATION      FUTURE VISIT INFORMATION:  Date: 11/11/2024  Time: 11:45AM   Location: [address]   REFERRAL INFORMATION:  Referring provider:  Ref by Cal Walker MD in [facility] INTERNAL MEDICINE   Reason for visit/diagnosis  Skin tag/mole removal/Skin Lesion.    RECORDS REQUESTED FROM:       Clinic name Comments Records Status Imaging Status    OV with Dr. Walker- Skin Lesion  In EPIC
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
## 2024-11-11 ENCOUNTER — OFFICE VISIT (OUTPATIENT)
Dept: DERMATOLOGY | Facility: CLINIC | Age: 44
End: 2024-11-11
Payer: COMMERCIAL

## 2024-11-11 ENCOUNTER — PRE VISIT (OUTPATIENT)
Dept: DERMATOLOGY | Facility: CLINIC | Age: 44
End: 2024-11-11
Payer: COMMERCIAL

## 2024-11-11 DIAGNOSIS — D48.5 NEOPLASM OF UNCERTAIN BEHAVIOR OF SKIN OF NECK: Primary | ICD-10-CM

## 2024-11-11 PROCEDURE — 88305 TISSUE EXAM BY PATHOLOGIST: CPT | Mod: 26 | Performed by: PATHOLOGY

## 2024-11-11 PROCEDURE — 88305 TISSUE EXAM BY PATHOLOGIST: CPT | Mod: TC | Performed by: STUDENT IN AN ORGANIZED HEALTH CARE EDUCATION/TRAINING PROGRAM

## 2024-11-11 ASSESSMENT — PAIN SCALES - GENERAL: PAINLEVEL_OUTOF10: NO PAIN (0)
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
## 2024-11-11 NOTE — NURSING NOTE
Dermatology Rooming Note    Majo Oreilly's goals for this visit include:   Chief Complaint   Patient presents with    Skin Check     FBSE; skin tag on neck area that is bothersome      Jamal BERGER CMA

## 2024-11-12 LAB
PATH REPORT.COMMENTS IMP SPEC: NORMAL
PATH REPORT.COMMENTS IMP SPEC: NORMAL
PATH REPORT.FINAL DX SPEC: NORMAL
PATH REPORT.GROSS SPEC: NORMAL
PATH REPORT.MICROSCOPIC SPEC OTHER STN: NORMAL
PATH REPORT.RELEVANT HX SPEC: NORMAL

## 2024-11-13 ENCOUNTER — TELEPHONE (OUTPATIENT)
Dept: DERMATOLOGY | Facility: CLINIC | Age: 44
End: 2024-11-13
Payer: COMMERCIAL

## 2024-11-13 NOTE — TELEPHONE ENCOUNTER
Left Voicemail (1st Attempt) and Sent Mychart (1st Attempt) for the patient to call back and schedule the following:    Appointment type: Return Dermatology   Provider: Miguel   Return date: around  11/11/25  Specialty phone number: [phone]  Additional appointment(s) needed: n/a  Additonal Notes: WQ appointment Request             Valorie Perdomo on 11/13/2024 at 2:27 PM

## 2024-11-17 ENCOUNTER — HEALTH MAINTENANCE LETTER (OUTPATIENT)
Age: 44
End: 2024-11-17

## 2024-11-21 ENCOUNTER — TELEPHONE (OUTPATIENT)
Dept: INTERNAL MEDICINE | Facility: CLINIC | Age: 44
End: 2024-11-21
Payer: COMMERCIAL

## 2024-11-21 NOTE — TELEPHONE ENCOUNTER
Patient confirmed scheduled appointment:  Date: 10/27/25  Time: 1:00pm  Visit type: P Return  Provider: Dr. Walker

## 2025-06-08 ASSESSMENT — PATIENT HEALTH QUESTIONNAIRE - PHQ9
SUM OF ALL RESPONSES TO PHQ QUESTIONS 1-9: 1
10. IF YOU CHECKED OFF ANY PROBLEMS, HOW DIFFICULT HAVE THESE PROBLEMS MADE IT FOR YOU TO DO YOUR WORK, TAKE CARE OF THINGS AT HOME, OR GET ALONG WITH OTHER PEOPLE: NOT DIFFICULT AT ALL
SUM OF ALL RESPONSES TO PHQ QUESTIONS 1-9: 1

## 2025-06-08 NOTE — PROGRESS NOTES
"HPI:    She states her brother who is 3 years older living in the [place], [place] area was found to have a dilated aorta, a bicuspid aortic valve? And possibly some other \"connective tissue issues\" He has seen cardiology in [place], [place] and apparently genetic testing as well (these results are not yet available). Ms. Oreilly states some unpredictable palpitations and chest pressure. She is smoking more than a PPD now. She had recent GYN surgery at the [facility]. No other HEENT, cardiopulmonary, abdominal, , GYN, neurological, systemic, psychiatric, lymphatic, endocrine, vascular complaints.   Past Medical History:   Diagnosis Date    Alopecia     Combined hyperlipidemia     Depression     Migraines     PCOS (polycystic ovarian syndrome)     PTSD (post-traumatic stress disorder)      Past Surgical History:   Procedure Laterality Date    EXTRACTION(S) DENTAL      GYN SURGERY  [facility] 2007    had ovarian cyst removed, had second surgery approx 2012    LASIK BILATERAL      left salpingo      ORTHOPEDIC SURGERY  approx 2009    knee surgery    SALPINGO OOPHORECTOMY,R/L/MARIBEL      left side     PE:    Vitals noted, gen nad, cooperative, alert, neck supple nl rom, lungs with good air movement, RRR, S1, S2, no MRG, abdomen no acute findings. Grossly normal neurological exam.         A/P:    1. Immunizations; Tdap 5/3/2016. Pfizer COVID vaccine x 2. Moderna COVID x 1. Influenza vaccine  10/21/2024.   2. Increased lipids on Simvastatin; lipids; 10/21/2024; HDL 34, LDL 81 and TG's 136  3. Migraines; PRN Axert  4. Depression on Fluoxetine; no change and this is beneficial.   5. EGD 6/1/2022 and 7/29/2022 Pathology noted in EMR; no reports. Gastric emptying study done 7/18/2022; normal. On Protonix   6. Mammogram; 10/11/2024  7. Fertility issues; she still would like to conceive and has been following at the [facility]. She is aware she can't be pregnant on many of her medications. See [facility] note from 5/16/2025  8. MTM note from " Cal Fitch 2/22/2024, Medication management for smoking cessation.   9. PAP/pelvic 8/27/2024.   10. Dermatology; seen Dr. Rivera 11/11/2024 and next 11/17/2025   11. Elevated WBC (16.6 with ANC 11.9 on 10/21/2024); smoking?;  peripheral smear was done 11/2/2024 and negative BCR ABL1 testing. WBC 7/23/2018 was 14.2  12. Smoking more than one PPD, Placed [facility] referral for smoking cessation  13. Brother's cardiac issues and her sxs of chest pain and palpitations. EKG today and Cardiac MRI imaging for aorta and stress test. She may need to see Congenital Cardiology here as well as genetic testing.         40 minutes spent on the date of the encounter doing chart review, history and exam, documentation and further activities as noted above

## 2025-06-09 ENCOUNTER — OFFICE VISIT (OUTPATIENT)
Dept: INTERNAL MEDICINE | Facility: CLINIC | Age: 45
End: 2025-06-09
Payer: COMMERCIAL

## 2025-06-09 VITALS
SYSTOLIC BLOOD PRESSURE: 130 MMHG | BODY MASS INDEX: 28.08 KG/M2 | DIASTOLIC BLOOD PRESSURE: 91 MMHG | TEMPERATURE: 97.5 F | HEART RATE: 77 BPM | HEIGHT: 66 IN | WEIGHT: 174.7 LBS | RESPIRATION RATE: 16 BRPM | OXYGEN SATURATION: 95 %

## 2025-06-09 DIAGNOSIS — Z71.6 ENCOUNTER FOR SMOKING CESSATION COUNSELING: ICD-10-CM

## 2025-06-09 DIAGNOSIS — R07.9 CHEST PAIN, UNSPECIFIED TYPE: ICD-10-CM

## 2025-06-09 DIAGNOSIS — R00.2 PALPITATIONS: Primary | ICD-10-CM

## 2025-06-09 LAB
ATRIAL RATE - MUSE: 66 BPM
DIASTOLIC BLOOD PRESSURE - MUSE: NORMAL MMHG
INTERPRETATION ECG - MUSE: NORMAL
P AXIS - MUSE: 64 DEGREES
PR INTERVAL - MUSE: 150 MS
QRS DURATION - MUSE: 88 MS
QT - MUSE: 396 MS
QTC - MUSE: 415 MS
R AXIS - MUSE: 55 DEGREES
SYSTOLIC BLOOD PRESSURE - MUSE: NORMAL MMHG
T AXIS - MUSE: 49 DEGREES
VENTRICULAR RATE- MUSE: 66 BPM

## 2025-06-09 PROCEDURE — 3080F DIAST BP >= 90 MM HG: CPT | Performed by: INTERNAL MEDICINE

## 2025-06-09 PROCEDURE — 93000 ELECTROCARDIOGRAM COMPLETE: CPT | Performed by: INTERNAL MEDICINE

## 2025-06-09 PROCEDURE — 3075F SYST BP GE 130 - 139MM HG: CPT | Performed by: INTERNAL MEDICINE

## 2025-06-09 PROCEDURE — 99215 OFFICE O/P EST HI 40 MIN: CPT | Mod: 25 | Performed by: INTERNAL MEDICINE

## 2025-06-09 RX ORDER — TIRZEPATIDE 12.5 MG/.5ML
12.5 INJECTION, SOLUTION SUBCUTANEOUS
COMMUNITY

## 2025-06-09 NOTE — PROGRESS NOTES
Majo is a 44 year old that presents in clinic today for the following:     Chief Complaint   Patient presents with    Heart Problem           6/9/2025    12:06 PM   Additional Questions   Roomed by raul Galvez as of today     PHQ-9 Total Score  1        Frederic Moya at 12:12 PM on 6/9/2025

## 2025-06-10 ENCOUNTER — TELEPHONE (OUTPATIENT)
Dept: INTERNAL MEDICINE | Facility: CLINIC | Age: 45
End: 2025-06-10
Payer: COMMERCIAL

## 2025-06-10 NOTE — TELEPHONE ENCOUNTER
MTM referral from: [place] clinic visit (referral by provider)    MTM referral outreach attempt #1 on Rossi 10, 2025 at 3:28 PM      Outcome: Spoke with patient declined visit because insurance doesn't cover MTM. Patient really just wants a prescription for Chantix.     Use private pay for the carrier/Plan on the flowsheet      Will send to pcp to see if can rx chantix    Yvonne Figeuroa [facility]  -[facility]  [phone]

## 2025-07-22 ENCOUNTER — HOSPITAL ENCOUNTER (OUTPATIENT)
Dept: CARDIOLOGY | Facility: CLINIC | Age: 45
Discharge: HOME | End: 2025-07-22
Attending: INTERNAL MEDICINE
Payer: COMMERCIAL

## 2025-07-22 VITALS — DIASTOLIC BLOOD PRESSURE: 87 MMHG | HEART RATE: 107 BPM | SYSTOLIC BLOOD PRESSURE: 144 MMHG

## 2025-07-22 DIAGNOSIS — R07.9 CHEST PAIN, UNSPECIFIED TYPE: ICD-10-CM

## 2025-07-22 DIAGNOSIS — R00.2 PALPITATIONS: ICD-10-CM

## 2025-07-22 DIAGNOSIS — Z71.6 ENCOUNTER FOR SMOKING CESSATION COUNSELING: ICD-10-CM

## 2025-07-22 PROCEDURE — 93016 CV STRESS TEST SUPVJ ONLY: CPT | Performed by: INTERNAL MEDICINE

## 2025-07-22 PROCEDURE — 71555 MRI ANGIO CHEST W OR W/O DYE: CPT | Mod: 26 | Performed by: INTERNAL MEDICINE

## 2025-07-22 PROCEDURE — 93018 CV STRESS TEST I&R ONLY: CPT | Performed by: INTERNAL MEDICINE

## 2025-07-22 PROCEDURE — 75563 CARD MRI W/STRESS IMG & DYE: CPT | Mod: 26 | Performed by: INTERNAL MEDICINE

## 2025-07-22 PROCEDURE — 75563 CARD MRI W/STRESS IMG & DYE: CPT

## 2025-07-22 PROCEDURE — 93005 ELECTROCARDIOGRAM TRACING: CPT

## 2025-07-22 PROCEDURE — 93017 CV STRESS TEST TRACING ONLY: CPT

## 2025-07-22 PROCEDURE — A9585 GADOBUTROL INJECTION: HCPCS | Performed by: INTERNAL MEDICINE

## 2025-07-22 PROCEDURE — 255N000002 HC RX 255 OP 636: Performed by: INTERNAL MEDICINE

## 2025-07-22 PROCEDURE — 71555 MRI ANGIO CHEST W OR W/O DYE: CPT

## 2025-07-22 RX ORDER — LORAZEPAM 0.5 MG/1
0.5 TABLET ORAL EVERY 30 MIN PRN
Status: DISCONTINUED | OUTPATIENT
Start: 2025-07-22 | End: 2025-07-23 | Stop reason: HOSPADM

## 2025-07-22 RX ORDER — SODIUM CHLORIDE 9 MG/ML
INJECTION, SOLUTION INTRAVENOUS CONTINUOUS PRN
Status: ACTIVE | OUTPATIENT
Start: 2025-07-22 | End: 2025-07-22

## 2025-07-22 RX ORDER — LIDOCAINE 40 MG/G
CREAM TOPICAL
Status: DISCONTINUED | OUTPATIENT
Start: 2025-07-22 | End: 2025-07-23 | Stop reason: HOSPADM

## 2025-07-22 RX ORDER — REGADENOSON 0.08 MG/ML
0.4 INJECTION, SOLUTION INTRAVENOUS ONCE
Status: DISCONTINUED | OUTPATIENT
Start: 2025-07-22 | End: 2025-07-23 | Stop reason: HOSPADM

## 2025-07-22 RX ORDER — CAFFEINE CITRATE 20 MG/ML
60 SOLUTION INTRAVENOUS
Status: ACTIVE | OUTPATIENT
Start: 2025-07-22 | End: 2025-07-22

## 2025-07-22 RX ORDER — AMINOPHYLLINE 25 MG/ML
50-100 INJECTION, SOLUTION INTRAVENOUS
Status: DISCONTINUED | OUTPATIENT
Start: 2025-07-22 | End: 2025-07-23 | Stop reason: HOSPADM

## 2025-07-22 RX ORDER — CAFFEINE 200 MG
200 TABLET ORAL
Status: ACTIVE | OUTPATIENT
Start: 2025-07-22 | End: 2025-07-22

## 2025-07-22 RX ORDER — GADOBUTROL 604.72 MG/ML
20 INJECTION INTRAVENOUS ONCE
Status: COMPLETED | OUTPATIENT
Start: 2025-07-22 | End: 2025-07-22

## 2025-07-22 RX ORDER — NITROGLYCERIN 0.4 MG/1
0.4 TABLET SUBLINGUAL EVERY 5 MIN PRN
Status: ACTIVE | OUTPATIENT
Start: 2025-07-22 | End: 2025-07-22

## 2025-07-22 RX ORDER — ALBUTEROL SULFATE 90 UG/1
2 INHALANT RESPIRATORY (INHALATION) EVERY 5 MIN PRN
Status: DISCONTINUED | OUTPATIENT
Start: 2025-07-22 | End: 2025-07-23 | Stop reason: HOSPADM

## 2025-07-22 RX ORDER — DIAZEPAM 5 MG/1
5 TABLET ORAL EVERY 30 MIN PRN
Status: DISCONTINUED | OUTPATIENT
Start: 2025-07-22 | End: 2025-07-23 | Stop reason: HOSPADM

## 2025-07-22 RX ADMIN — GADOBUTROL 20 ML: 604.72 INJECTION INTRAVENOUS at 14:59

## 2025-07-24 ENCOUNTER — OFFICE VISIT (OUTPATIENT)
Dept: INTERNAL MEDICINE | Facility: CLINIC | Age: 45
End: 2025-07-24
Payer: COMMERCIAL

## 2025-07-24 VITALS
HEIGHT: 66 IN | TEMPERATURE: 98.2 F | HEART RATE: 67 BPM | DIASTOLIC BLOOD PRESSURE: 86 MMHG | WEIGHT: 169.1 LBS | BODY MASS INDEX: 27.18 KG/M2 | SYSTOLIC BLOOD PRESSURE: 124 MMHG | RESPIRATION RATE: 16 BRPM | OXYGEN SATURATION: 95 %

## 2025-07-24 DIAGNOSIS — Z84.81 FAMILY HISTORY OF CARRIER OF GENETIC DISEASE: Primary | ICD-10-CM

## 2025-07-24 RX ORDER — NAPROXEN SODIUM 220 MG/1
TABLET, FILM COATED ORAL PRN
COMMUNITY

## 2025-07-24 RX ORDER — ONDANSETRON 4 MG/1
TABLET, ORALLY DISINTEGRATING ORAL PRN
COMMUNITY
Start: 2024-06-26

## 2025-07-24 NOTE — PROGRESS NOTES
"HPI:    She states her brother has a dilated aorta and has had MRI/MRA chest/cardiac imaging. He has had genetic evaluation and may be a \"carrier\" for a vascular abnormality. She denies any new HEENT, cardiopulmonary, abdominal, , GYN, neurological, systemic, psychiatric, lymphatic, endocrine, vascular complaints.     Past Medical History:   Diagnosis Date    Alopecia     Combined hyperlipidemia     Depression     Migraines     PCOS (polycystic ovarian syndrome)     PTSD (post-traumatic stress disorder)      Past Surgical History:   Procedure Laterality Date    EXTRACTION(S) DENTAL      GYN SURGERY  Aprox     had ovarian cyst removed, had second surgery approx     LASIK BILATERAL      left salpingo      ORTHOPEDIC SURGERY  approx     knee surgery    SALPINGO OOPHORECTOMY,R/L/MARIBEL      left side     PE:    Vitals noted, gen, nad, cooperative, alert, neck supple nl rom, lungs with good air movement and clear, RRR, S1, S2, no MRG, abdomen, no acute findings, Grossly normal neurological exam.     MRA Chest w/o & w Contrast Angiogram                                                   [facility]                                                     CMR Report      MRN:              [id number]                                  Name:        JODI ARROYO CECE                                  :             1980-Sep-04                                  Scan Date:                                         Electronically signed by Alec Henry  15:37:29    VITALS   ==========================================================================================================    HEIGHT: 65.0 in    (165.1 cm)  WEIGHT: 174.0 lbs    (78.9 kgs)  BSA: 1.86 m^2    FINAL IMPRESSION   ==========================================================================================================    Normal biventricular size with normal systolic function.  Quantitative LVEF is 78%.  Quantitative RVEF is  59%.  Delayed " hyperenhancement reveals no scar, fibrosis or evidence of infiltrative disease.  Regedenoson induced stress perfusion is negative for ischemia.  Aortic valve is trileaflet.  Normal-sized aortic root, ascending aorta, transverse arch and descending thoracic aorta without evidence  of dissection.    SUMMARY   ==========================================================================================================    1. The aortic root, ascending aorta, transverse arch and descending thoracic aorta are normal in size  without an aneurysm or dissection.    2. The aortic arch is left sided. There is normal branching of the arch vessels. There is no coarctation.    3. The main and proximal branch pulmonary arteries are normal in size.     4. The systemic venous connections are normal.     LEFT VENTRICLE: Quantitative LVEF 78.3 %. LV wall thickness is normal. LV cavity size is normal. LV systolic function is  normal.    LGE: Delayed hyperenhancement reveals no scar, fibrosis or evidence of infiltrative disease.    STRESS: Regedenoson induced stress perfusion is negative for ischemia.    RIGHT VENTRICLE: Quantitative RVEF 58.9 %. RV cavity size is normal. RV systolic function is normal.    LEFT ATRIUM: LA cavity size is normal.    RIGHT ATRIUM: RA cavity size is normal.    AORTIC VALVE: Aortic valve is trileaflet.    MITRAL VALVE: Mitral valve leaflets are normal.    TRICUSPID VALVE: Tricuspid valve leaflets are normal.    AORTIC ROOT: The aortic root is normal.    CORE EXAM   ==========================================================================================================    MEASUREMENTS   ----------------------------------------------------------------------------------------      VOLUMETRIC ANALYSIS       ----------------------------------------------  .---------------------------------------------------------.                   LV     Reference  RV     Reference    +-----+-----------+-------+-----------+-------+-----------+   EDV  ml         116.7   ()  129.6   ()         ml/m^2      62.6   (59-93)    69.5   (57-94)     ESV  ml          25.3   (25-62)    53.2   (20-72)          ml/m^2      13.6   (16-34)    28.5   (14-40)     CO   L/min       6.67              5.57                    L/min/m^2   3.58              2.99               SV   ml          91.4   ()   76.4   ()         ml/m^2      49.0   (39-63)    40.9   (37-61)     EF   %           78.3   (58-76)    58.9   (53-77)    '-----+-----------+-------+-----------+-------+-----------'            CARDIAC OUTPUT HR:  73 BPM      LV DIMENSIONS       ----------------------------------------------          WALL THICKNESS - ANTEROSEPTAL:  0.9 cm          WALL THICKNESS - INFEROLATERAL:  0.8 cm          LV JULIA:  4.9 cm        LA DIMENSIONS (LV SYSTOLE)       ----------------------------------------------          DIAMETER:  3.3 cm        AORTIC ROOT DIMENSIONS       ----------------------------------------------          ANNULUS:  3.2 cm    STRESS   ==========================================================================================================    STRESS PROTOCOL   ----------------------------------------------------------------------------------------      Regadenoson    RESTING DATA   ----------------------------------------------------------------------------------------      SYSTOLIC BP:  139 mmHg      DIASTOLIC BP:  90 mmHg      RESTING HR:  73 BPM      ECG:  NSR     STRESS DATA   ----------------------------------------------------------------------------------------      PEAK SYSTOLIC BP:  159 mmHg      PEAK DIASTOLIC BP:  97 mmHg      PEAK HR:  107 BPM      POST STRESS ECG:  No change from pre-stress ECG    VASCULAR    ==========================================================================================================    UPPER VASCULAR   ----------------------------------------------------------------------------------------    EVALUATION OF THE THORACIC AORTA  ================================      COMMENTS        (no comments)    .----------------------------------------------------------------------------------------------------.   EVALUATION DETAILS (Thoracic Aorta)                                                                  ----------------------------------------------------------------------------------------------------   AORTIC ROOT                                                                                           Annulus Max Diameter A:  3.2 cm                                                                       Annulus Max Diameter B:  3.1 cm                                                                       Sinotubular Junction Max Diameter A:  2.8 cm                                                          Sinotubular Junction Max Diameter B:  2.7 cm                                                         +----------------------------------------------------------------------------------------------------+   ASCENDING AORTA                                                                                       Dimension A:  3.2 cm                                                                                  Dimension B:  3 cm                                                                                   +----------------------------------------------------------------------------------------------------+   ARCH OF THE AORTA                                                                                     Dimension A:  2.5 cm                                                                                  Dimension B:  2.5 cm                                                                                  +----------------------------------------------------------------------------------------------------+   ISTHMUS OF THE AORTA                                                                                  Dimension A:  2 cm                                                                                    Dimension B:  2 cm                                                                                   +----------------------------------------------------------------------------------------------------+   MID-DESCENDING AORTA                                                                                  Dimension A:  2.3 cm                                                                                  Dimension B:  2.2 cm                                                                                 +----------------------------------------------------------------------------------------------------+   DISTAL DESCENDING AORTA                                                                               Dimension A:  2.1 cm                                                                                  Dimension B:  2.1 cm                                                                                 .----------------------------------------------------------------------------------------------------.    SCAN INFO   ==========================================================================================================    GENERAL   ----------------------------------------------------------------------------------------      SCANNER       ----------------------------------------------          :  SIEMENS          MODEL:  Aera          PULSE SEQUENCES:  SSFP cine, 2D LGE segmented, 2D LGE single-shot, First-pass perfusion with          stress, 3D non-contrast MRA         CONTRAST AGENT       ----------------------------------------------           TYPE:  Gadavist          GD CONCENTRATION:  0.5 M          VOLUME ADMINISTERED:  20 ml          DOSAGE:  0.13 mmol/kg        SETUP       ----------------------------------------------          REASON(S) FOR SCAN:  Chest pain, Aorta           REFERRING PHYSICIAN:  LARRY GOVEA          ATTENDING PHYSICIAN:  LARRY GOVEA    BILLING   ==========================================================================================================    ICD10 Codes      R00.2, R07.9, Z71.6    Report generated by [employer], a product of [employer]  MR Cardiac w Contrast and Stress                                                   [facility]                                                     CMR Report      MRN:              [id number]                                  Name:        JODI ARROYO                                  :             1980-Sep-04                                  Scan Date:                                         Electronically signed by Alec Henry  15:37:29    VITALS   ==========================================================================================================    HEIGHT: 65.0 in    (165.1 cm)  WEIGHT: 174.0 lbs    (78.9 kgs)  BSA: 1.86 m^2    FINAL IMPRESSION   ==========================================================================================================    Normal biventricular size with normal systolic function.  Quantitative LVEF is 78%.  Quantitative RVEF is  59%.  Delayed hyperenhancement reveals no scar, fibrosis or evidence of infiltrative disease.  Regedenoson induced stress perfusion is negative for ischemia.  Aortic valve is trileaflet.  Normal-sized aortic root, ascending aorta, transverse arch and descending thoracic aorta without evidence  of dissection.    SUMMARY   ==========================================================================================================    1. The aortic root, ascending aorta,  transverse arch and descending thoracic aorta are normal in size  without an aneurysm or dissection.    2. The aortic arch is left sided. There is normal branching of the arch vessels. There is no coarctation.    3. The main and proximal branch pulmonary arteries are normal in size.     4. The systemic venous connections are normal.     LEFT VENTRICLE: Quantitative LVEF 78.3 %. LV wall thickness is normal. LV cavity size is normal. LV systolic function is  normal.    LGE: Delayed hyperenhancement reveals no scar, fibrosis or evidence of infiltrative disease.    STRESS: Regedenoson induced stress perfusion is negative for ischemia.    RIGHT VENTRICLE: Quantitative RVEF 58.9 %. RV cavity size is normal. RV systolic function is normal.    LEFT ATRIUM: LA cavity size is normal.    RIGHT ATRIUM: RA cavity size is normal.    AORTIC VALVE: Aortic valve is trileaflet.    MITRAL VALVE: Mitral valve leaflets are normal.    TRICUSPID VALVE: Tricuspid valve leaflets are normal.    AORTIC ROOT: The aortic root is normal.    CORE EXAM   ==========================================================================================================    MEASUREMENTS   ----------------------------------------------------------------------------------------      VOLUMETRIC ANALYSIS       ----------------------------------------------  .---------------------------------------------------------.                   LV     Reference  RV     Reference   +-----+-----------+-------+-----------+-------+-----------+   EDV  ml         116.7   ()  129.6   ()         ml/m^2      62.6   (59-93)    69.5   (57-94)     ESV  ml          25.3   (25-62)    53.2   (20-72)          ml/m^2      13.6   (16-34)    28.5   (14-40)     CO   L/min       6.67              5.57                    L/min/m^2   3.58              2.99               SV   ml          91.4   ()   76.4   ()          ml/m^2      49.0   (39-63)    40.9   (37-61)     EF   %           78.3   (58-76)    58.9   (53-77)    '-----+-----------+-------+-----------+-------+-----------'            CARDIAC OUTPUT HR:  73 BPM      LV DIMENSIONS       ----------------------------------------------          WALL THICKNESS - ANTEROSEPTAL:  0.9 cm          WALL THICKNESS - INFEROLATERAL:  0.8 cm          LV JULIA:  4.9 cm        LA DIMENSIONS (LV SYSTOLE)       ----------------------------------------------          DIAMETER:  3.3 cm        AORTIC ROOT DIMENSIONS       ----------------------------------------------          ANNULUS:  3.2 cm    STRESS   ==========================================================================================================    STRESS PROTOCOL   ----------------------------------------------------------------------------------------      Regadenoson    RESTING DATA   ----------------------------------------------------------------------------------------      SYSTOLIC BP:  139 mmHg      DIASTOLIC BP:  90 mmHg      RESTING HR:  73 BPM      ECG:  NSR     STRESS DATA   ----------------------------------------------------------------------------------------      PEAK SYSTOLIC BP:  159 mmHg      PEAK DIASTOLIC BP:  97 mmHg      PEAK HR:  107 BPM      POST STRESS ECG:  No change from pre-stress ECG    VASCULAR   ==========================================================================================================    UPPER VASCULAR   ----------------------------------------------------------------------------------------    EVALUATION OF THE THORACIC AORTA  ================================      COMMENTS        (no comments)    .----------------------------------------------------------------------------------------------------.   EVALUATION DETAILS (Thoracic Aorta)                                                                   ----------------------------------------------------------------------------------------------------   AORTIC ROOT                                                                                           Annulus Max Diameter A:  3.2 cm                                                                       Annulus Max Diameter B:  3.1 cm                                                                       Sinotubular Junction Max Diameter A:  2.8 cm                                                          Sinotubular Junction Max Diameter B:  2.7 cm                                                         +----------------------------------------------------------------------------------------------------+   ASCENDING AORTA                                                                                       Dimension A:  3.2 cm                                                                                  Dimension B:  3 cm                                                                                   +----------------------------------------------------------------------------------------------------+   ARCH OF THE AORTA                                                                                     Dimension A:  2.5 cm                                                                                  Dimension B:  2.5 cm                                                                                 +----------------------------------------------------------------------------------------------------+   ISTHMUS OF THE AORTA                                                                                  Dimension A:  2 cm                                                                                    Dimension B:  2 cm                                                                                    +----------------------------------------------------------------------------------------------------+   MID-DESCENDING AORTA                                                                                  Dimension A:  2.3 cm                                                                                  Dimension B:  2.2 cm                                                                                 +----------------------------------------------------------------------------------------------------+   DISTAL DESCENDING AORTA                                                                               Dimension A:  2.1 cm                                                                                  Dimension B:  2.1 cm                                                                                 .----------------------------------------------------------------------------------------------------.    SCAN INFO   ==========================================================================================================    GENERAL   ----------------------------------------------------------------------------------------      SCANNER       ----------------------------------------------          :  SIEMENS          MODEL:  Aera          PULSE SEQUENCES:  SSFP cine, 2D LGE segmented, 2D LGE single-shot, First-pass perfusion with          stress, 3D non-contrast MRA         CONTRAST AGENT       ----------------------------------------------          TYPE:  Gadavist          GD CONCENTRATION:  0.5 M          VOLUME ADMINISTERED:  20 ml          DOSAGE:  0.13 mmol/kg        SETUP       ----------------------------------------------          REASON(S) FOR SCAN:  Chest pain, Aorta           REFERRING PHYSICIAN:  LARRY GOVEA          ATTENDING PHYSICIAN:  LARRY CHOPRA    ==========================================================================================================    ICD10 Codes      R00.2, R07.9, Z71.6    Report generated by [employer], a product of [employer]      A/P:    1. Immunizations; Tdap 5/3/2016. Pfizer COVID vaccine x 2. Moderna COVID x 1.   2. Increased lipids on Simvastatin; lipids; 10/21/2024; HDL 34, LDL 81 and TG's 136  3. Migraines; PRN Axert  4. Depression on Fluoxetine; no change and this is beneficial.   5. EGD 6/1/2022 and 7/29/2022 Pathology noted in EMR; no reports. Gastric emptying study done 7/18/2022; normal. On Protonix   6. Mammogram; 10/11/2024  7. Fertility issues; she still would like to conceive and has been following at the [facility]. She is aware she can't be pregnant on many of her medications. See [facility] note from 5/16/2025  8. MTM note from [facility] 2/22/2024, Medication management for smoking cessation.   9. PAP/pelvic 8/27/2024.   10. Dermatology; next 2/10/2026   11. Elevated WBC (16.6 with ANC 11.9 on 10/21/2024); smoking?;  peripheral smear was done 11/2/2024 and negative BCR ABL1 testing. WBC 7/23/2018 was 14.2  12. Brother's cardiac issues and her sxs of chest pain and palpitations.  She had Cardiac MRI imagining 7/22/2025        30 minutes spent on the date of the encounter doing chart review, history and exam, documentation and further activities as noted above

## 2025-08-20 ENCOUNTER — VIRTUAL VISIT (OUTPATIENT)
Dept: PHARMACY | Facility: CLINIC | Age: 45
End: 2025-08-20
Payer: COMMERCIAL

## 2025-08-20 DIAGNOSIS — K21.9 GASTROESOPHAGEAL REFLUX DISEASE WITHOUT ESOPHAGITIS: ICD-10-CM

## 2025-08-20 DIAGNOSIS — F32.A DEPRESSION, UNSPECIFIED DEPRESSION TYPE: ICD-10-CM

## 2025-08-20 DIAGNOSIS — R52 PAIN: ICD-10-CM

## 2025-08-20 DIAGNOSIS — G43.909 MIGRAINE WITHOUT STATUS MIGRAINOSUS, NOT INTRACTABLE, UNSPECIFIED MIGRAINE TYPE: ICD-10-CM

## 2025-08-20 DIAGNOSIS — Z78.9 TAKES DIETARY SUPPLEMENTS: ICD-10-CM

## 2025-08-20 DIAGNOSIS — Z30.018 HORMONAL CONTRACEPTIVE: ICD-10-CM

## 2025-08-20 DIAGNOSIS — E66.3 OVERWEIGHT (BMI 25.0-29.9): ICD-10-CM

## 2025-08-20 DIAGNOSIS — R11.0 NAUSEA: ICD-10-CM

## 2025-08-20 DIAGNOSIS — Z72.0 TOBACCO ABUSE: Primary | ICD-10-CM

## 2025-08-20 DIAGNOSIS — E78.2 MIXED HYPERLIPIDEMIA: ICD-10-CM

## 2025-08-20 DIAGNOSIS — G47.00 INSOMNIA, UNSPECIFIED TYPE: ICD-10-CM

## 2025-08-20 PROCEDURE — 99605 MTMS BY PHARM NP 15 MIN: CPT | Mod: 95

## 2025-08-20 PROCEDURE — 99607 MTMS BY PHARM ADDL 15 MIN: CPT | Mod: 95

## 2025-08-20 RX ORDER — VARENICLINE TARTRATE 0.5 (11)-1
KIT ORAL
Qty: 53 TABLET | Refills: 0 | Status: SHIPPED | OUTPATIENT
Start: 2025-08-20

## 2025-09-02 ENCOUNTER — LAB REQUISITION (OUTPATIENT)
Dept: LAB | Facility: CLINIC | Age: 45
End: 2025-09-02

## 2025-09-02 ENCOUNTER — MYC MEDICAL ADVICE (OUTPATIENT)
Dept: INTERNAL MEDICINE | Facility: CLINIC | Age: 45
End: 2025-09-02
Payer: COMMERCIAL

## 2025-09-02 DIAGNOSIS — D72.829 LEUKOCYTOSIS, UNSPECIFIED TYPE: Primary | ICD-10-CM

## 2025-09-02 DIAGNOSIS — Z12.4 ENCOUNTER FOR SCREENING FOR MALIGNANT NEOPLASM OF CERVIX: ICD-10-CM

## 2025-09-02 PROCEDURE — 87624 HPV HI-RISK TYP POOLED RSLT: CPT | Performed by: OBSTETRICS & GYNECOLOGY

## 2025-09-03 ENCOUNTER — PATIENT OUTREACH (OUTPATIENT)
Dept: ONCOLOGY | Facility: CLINIC | Age: 45
End: 2025-09-03
Payer: COMMERCIAL

## 2025-09-03 LAB
HPV HR 12 DNA CVX QL NAA+PROBE: NEGATIVE
HPV16 DNA CVX QL NAA+PROBE: NEGATIVE
HPV18 DNA CVX QL NAA+PROBE: NEGATIVE
HUMAN PAPILLOMA VIRUS FINAL DIAGNOSIS: NORMAL

## (undated) RX ORDER — REGADENOSON 0.08 MG/ML
INJECTION, SOLUTION INTRAVENOUS
Status: DISPENSED
Start: 2025-07-22